# Patient Record
Sex: FEMALE | Race: WHITE | NOT HISPANIC OR LATINO | URBAN - METROPOLITAN AREA
[De-identification: names, ages, dates, MRNs, and addresses within clinical notes are randomized per-mention and may not be internally consistent; named-entity substitution may affect disease eponyms.]

---

## 2017-01-27 ENCOUNTER — OUTPATIENT (OUTPATIENT)
Dept: OUTPATIENT SERVICES | Facility: HOSPITAL | Age: 62
LOS: 1 days | End: 2017-01-27
Payer: COMMERCIAL

## 2017-01-27 ENCOUNTER — APPOINTMENT (OUTPATIENT)
Dept: CT IMAGING | Facility: IMAGING CENTER | Age: 62
End: 2017-01-27

## 2017-01-27 ENCOUNTER — APPOINTMENT (OUTPATIENT)
Dept: MRI IMAGING | Facility: IMAGING CENTER | Age: 62
End: 2017-01-27

## 2017-01-27 DIAGNOSIS — Z00.8 ENCOUNTER FOR OTHER GENERAL EXAMINATION: ICD-10-CM

## 2017-01-27 PROCEDURE — A9585: CPT

## 2017-01-27 PROCEDURE — 71260 CT THORAX DX C+: CPT

## 2017-01-27 PROCEDURE — 82565 ASSAY OF CREATININE: CPT

## 2017-01-27 PROCEDURE — 72197 MRI PELVIS W/O & W/DYE: CPT

## 2017-01-27 PROCEDURE — 74177 CT ABD & PELVIS W/CONTRAST: CPT

## 2017-01-31 DIAGNOSIS — I31.3 PERICARDIAL EFFUSION (NONINFLAMMATORY): ICD-10-CM

## 2017-01-31 DIAGNOSIS — C54.1 MALIGNANT NEOPLASM OF ENDOMETRIUM: ICD-10-CM

## 2017-01-31 DIAGNOSIS — C55 MALIGNANT NEOPLASM OF UTERUS, PART UNSPECIFIED: ICD-10-CM

## 2017-03-04 ENCOUNTER — APPOINTMENT (OUTPATIENT)
Dept: ULTRASOUND IMAGING | Facility: IMAGING CENTER | Age: 62
End: 2017-03-04

## 2017-03-04 ENCOUNTER — OUTPATIENT (OUTPATIENT)
Dept: OUTPATIENT SERVICES | Facility: HOSPITAL | Age: 62
LOS: 1 days | End: 2017-03-04
Payer: COMMERCIAL

## 2017-03-04 DIAGNOSIS — Z00.8 ENCOUNTER FOR OTHER GENERAL EXAMINATION: ICD-10-CM

## 2017-03-04 PROCEDURE — 93971 EXTREMITY STUDY: CPT

## 2017-08-18 ENCOUNTER — APPOINTMENT (OUTPATIENT)
Dept: CT IMAGING | Facility: IMAGING CENTER | Age: 62
End: 2017-08-18
Payer: COMMERCIAL

## 2017-08-18 ENCOUNTER — APPOINTMENT (OUTPATIENT)
Dept: MRI IMAGING | Facility: IMAGING CENTER | Age: 62
End: 2017-08-18
Payer: COMMERCIAL

## 2017-08-18 ENCOUNTER — OUTPATIENT (OUTPATIENT)
Dept: OUTPATIENT SERVICES | Facility: HOSPITAL | Age: 62
LOS: 1 days | End: 2017-08-18
Payer: COMMERCIAL

## 2017-08-18 DIAGNOSIS — Z00.8 ENCOUNTER FOR OTHER GENERAL EXAMINATION: ICD-10-CM

## 2017-08-18 PROCEDURE — 74177 CT ABD & PELVIS W/CONTRAST: CPT | Mod: 26

## 2017-08-18 PROCEDURE — 74177 CT ABD & PELVIS W/CONTRAST: CPT

## 2017-08-18 PROCEDURE — 71260 CT THORAX DX C+: CPT

## 2017-08-18 PROCEDURE — 71260 CT THORAX DX C+: CPT | Mod: 26

## 2017-08-18 PROCEDURE — 82565 ASSAY OF CREATININE: CPT

## 2017-08-22 ENCOUNTER — RESULT REVIEW (OUTPATIENT)
Age: 62
End: 2017-08-22

## 2017-10-06 ENCOUNTER — APPOINTMENT (OUTPATIENT)
Dept: MAMMOGRAPHY | Facility: IMAGING CENTER | Age: 62
End: 2017-10-06
Payer: COMMERCIAL

## 2017-10-06 ENCOUNTER — APPOINTMENT (OUTPATIENT)
Dept: ULTRASOUND IMAGING | Facility: IMAGING CENTER | Age: 62
End: 2017-10-06
Payer: COMMERCIAL

## 2017-10-06 ENCOUNTER — OUTPATIENT (OUTPATIENT)
Dept: OUTPATIENT SERVICES | Facility: HOSPITAL | Age: 62
LOS: 1 days | End: 2017-10-06
Payer: COMMERCIAL

## 2017-10-06 DIAGNOSIS — Z00.8 ENCOUNTER FOR OTHER GENERAL EXAMINATION: ICD-10-CM

## 2017-10-06 PROCEDURE — 77063 BREAST TOMOSYNTHESIS BI: CPT

## 2017-10-06 PROCEDURE — G0202: CPT | Mod: 26

## 2017-10-06 PROCEDURE — 76641 ULTRASOUND BREAST COMPLETE: CPT

## 2017-10-06 PROCEDURE — 77067 SCR MAMMO BI INCL CAD: CPT

## 2017-10-06 PROCEDURE — 76641 ULTRASOUND BREAST COMPLETE: CPT | Mod: 26,50

## 2017-10-06 PROCEDURE — 77063 BREAST TOMOSYNTHESIS BI: CPT | Mod: 26

## 2017-10-27 ENCOUNTER — APPOINTMENT (OUTPATIENT)
Dept: GYNECOLOGIC ONCOLOGY | Facility: CLINIC | Age: 62
End: 2017-10-27
Payer: COMMERCIAL

## 2017-10-27 VITALS
DIASTOLIC BLOOD PRESSURE: 64 MMHG | BODY MASS INDEX: 27.33 KG/M2 | HEART RATE: 69 BPM | SYSTOLIC BLOOD PRESSURE: 106 MMHG | HEIGHT: 65.5 IN | WEIGHT: 166 LBS

## 2017-10-27 DIAGNOSIS — Z78.9 OTHER SPECIFIED HEALTH STATUS: ICD-10-CM

## 2017-10-27 DIAGNOSIS — Z80.3 FAMILY HISTORY OF MALIGNANT NEOPLASM OF BREAST: ICD-10-CM

## 2017-10-27 DIAGNOSIS — K40.90 UNILATERAL INGUINAL HERNIA, W/OUT OBSTRUCTION OR GANGRENE, NOT SPECIFIED AS RECURRENT: ICD-10-CM

## 2017-10-27 DIAGNOSIS — Z80.0 FAMILY HISTORY OF MALIGNANT NEOPLASM OF DIGESTIVE ORGANS: ICD-10-CM

## 2017-10-27 DIAGNOSIS — N93.9 ABNORMAL UTERINE AND VAGINAL BLEEDING, UNSPECIFIED: ICD-10-CM

## 2017-10-27 DIAGNOSIS — Z80.1 FAMILY HISTORY OF MALIGNANT NEOPLASM OF TRACHEA, BRONCHUS AND LUNG: ICD-10-CM

## 2017-10-27 PROCEDURE — 99204 OFFICE O/P NEW MOD 45 MIN: CPT

## 2017-10-27 RX ORDER — DENOSUMAB 60 MG/ML
60 INJECTION SUBCUTANEOUS
Refills: 0 | Status: ACTIVE | COMMUNITY

## 2018-03-23 ENCOUNTER — OUTPATIENT (OUTPATIENT)
Dept: OUTPATIENT SERVICES | Facility: HOSPITAL | Age: 63
LOS: 1 days | End: 2018-03-23
Payer: COMMERCIAL

## 2018-03-23 ENCOUNTER — APPOINTMENT (OUTPATIENT)
Dept: MRI IMAGING | Facility: IMAGING CENTER | Age: 63
End: 2018-03-23
Payer: COMMERCIAL

## 2018-03-23 ENCOUNTER — APPOINTMENT (OUTPATIENT)
Dept: CT IMAGING | Facility: IMAGING CENTER | Age: 63
End: 2018-03-23
Payer: COMMERCIAL

## 2018-03-23 DIAGNOSIS — C55 MALIGNANT NEOPLASM OF UTERUS, PART UNSPECIFIED: ICD-10-CM

## 2018-03-23 PROCEDURE — 74183 MRI ABD W/O CNTR FLWD CNTR: CPT | Mod: 26

## 2018-03-23 PROCEDURE — 71250 CT THORAX DX C-: CPT

## 2018-03-23 PROCEDURE — 74183 MRI ABD W/O CNTR FLWD CNTR: CPT

## 2018-03-23 PROCEDURE — 72197 MRI PELVIS W/O & W/DYE: CPT

## 2018-03-23 PROCEDURE — 71250 CT THORAX DX C-: CPT | Mod: 26

## 2018-03-23 PROCEDURE — 72197 MRI PELVIS W/O & W/DYE: CPT | Mod: 26

## 2018-03-23 PROCEDURE — 82565 ASSAY OF CREATININE: CPT

## 2018-03-23 PROCEDURE — A9585: CPT

## 2018-04-06 ENCOUNTER — OUTPATIENT (OUTPATIENT)
Dept: OUTPATIENT SERVICES | Facility: HOSPITAL | Age: 63
LOS: 1 days | End: 2018-04-06
Payer: COMMERCIAL

## 2018-04-06 ENCOUNTER — APPOINTMENT (OUTPATIENT)
Dept: MAMMOGRAPHY | Facility: IMAGING CENTER | Age: 63
End: 2018-04-06
Payer: COMMERCIAL

## 2018-04-06 DIAGNOSIS — Z00.8 ENCOUNTER FOR OTHER GENERAL EXAMINATION: ICD-10-CM

## 2018-04-06 PROCEDURE — G0279: CPT

## 2018-04-06 PROCEDURE — 77065 DX MAMMO INCL CAD UNI: CPT

## 2018-04-06 PROCEDURE — 77065 DX MAMMO INCL CAD UNI: CPT | Mod: 26,RT

## 2018-04-06 PROCEDURE — G0279: CPT | Mod: 26

## 2018-07-05 ENCOUNTER — RX RENEWAL (OUTPATIENT)
Age: 63
End: 2018-07-05

## 2018-07-20 ENCOUNTER — RECORD ABSTRACTING (OUTPATIENT)
Age: 63
End: 2018-07-20

## 2018-07-26 ENCOUNTER — FORM ENCOUNTER (OUTPATIENT)
Age: 63
End: 2018-07-26

## 2018-07-27 ENCOUNTER — OUTPATIENT (OUTPATIENT)
Dept: OUTPATIENT SERVICES | Facility: HOSPITAL | Age: 63
LOS: 1 days | End: 2018-07-27
Payer: COMMERCIAL

## 2018-07-27 ENCOUNTER — APPOINTMENT (OUTPATIENT)
Dept: MRI IMAGING | Facility: IMAGING CENTER | Age: 63
End: 2018-07-27
Payer: COMMERCIAL

## 2018-07-27 DIAGNOSIS — C55 MALIGNANT NEOPLASM OF UTERUS, PART UNSPECIFIED: ICD-10-CM

## 2018-07-27 PROCEDURE — 73723 MRI JOINT LWR EXTR W/O&W/DYE: CPT

## 2018-07-27 PROCEDURE — 73723 MRI JOINT LWR EXTR W/O&W/DYE: CPT | Mod: 26,LT

## 2018-07-27 PROCEDURE — 82565 ASSAY OF CREATININE: CPT

## 2018-07-27 PROCEDURE — A9585: CPT

## 2018-08-10 ENCOUNTER — APPOINTMENT (OUTPATIENT)
Dept: PULMONOLOGY | Facility: CLINIC | Age: 63
End: 2018-08-10

## 2018-11-16 ENCOUNTER — APPOINTMENT (OUTPATIENT)
Dept: ULTRASOUND IMAGING | Facility: IMAGING CENTER | Age: 63
End: 2018-11-16
Payer: COMMERCIAL

## 2018-11-16 ENCOUNTER — OUTPATIENT (OUTPATIENT)
Dept: OUTPATIENT SERVICES | Facility: HOSPITAL | Age: 63
LOS: 1 days | End: 2018-11-16
Payer: COMMERCIAL

## 2018-11-16 ENCOUNTER — APPOINTMENT (OUTPATIENT)
Dept: MAMMOGRAPHY | Facility: IMAGING CENTER | Age: 63
End: 2018-11-16
Payer: COMMERCIAL

## 2018-11-16 DIAGNOSIS — R92.8 OTHER ABNORMAL AND INCONCLUSIVE FINDINGS ON DIAGNOSTIC IMAGING OF BREAST: ICD-10-CM

## 2018-11-16 PROCEDURE — 77067 SCR MAMMO BI INCL CAD: CPT | Mod: 26

## 2018-11-16 PROCEDURE — 77063 BREAST TOMOSYNTHESIS BI: CPT | Mod: 26

## 2018-11-16 PROCEDURE — 76641 ULTRASOUND BREAST COMPLETE: CPT | Mod: 26,50

## 2018-11-16 PROCEDURE — 76641 ULTRASOUND BREAST COMPLETE: CPT

## 2018-11-16 PROCEDURE — 77063 BREAST TOMOSYNTHESIS BI: CPT

## 2018-11-16 PROCEDURE — 77067 SCR MAMMO BI INCL CAD: CPT

## 2018-12-07 ENCOUNTER — APPOINTMENT (OUTPATIENT)
Dept: PULMONOLOGY | Facility: CLINIC | Age: 63
End: 2018-12-07
Payer: COMMERCIAL

## 2018-12-07 VITALS
HEIGHT: 65 IN | HEART RATE: 69 BPM | WEIGHT: 160 LBS | BODY MASS INDEX: 26.66 KG/M2 | SYSTOLIC BLOOD PRESSURE: 103 MMHG | RESPIRATION RATE: 16 BRPM | TEMPERATURE: 98.7 F | OXYGEN SATURATION: 98 % | DIASTOLIC BLOOD PRESSURE: 67 MMHG

## 2018-12-07 PROCEDURE — 99213 OFFICE O/P EST LOW 20 MIN: CPT

## 2019-02-08 ENCOUNTER — APPOINTMENT (OUTPATIENT)
Dept: GYNECOLOGIC ONCOLOGY | Facility: CLINIC | Age: 64
End: 2019-02-08
Payer: MEDICARE

## 2019-02-08 VITALS
HEIGHT: 65 IN | SYSTOLIC BLOOD PRESSURE: 133 MMHG | HEART RATE: 72 BPM | BODY MASS INDEX: 28.57 KG/M2 | DIASTOLIC BLOOD PRESSURE: 79 MMHG | WEIGHT: 171.5 LBS

## 2019-02-08 PROCEDURE — 99213 OFFICE O/P EST LOW 20 MIN: CPT

## 2019-03-29 ENCOUNTER — RX RENEWAL (OUTPATIENT)
Age: 64
End: 2019-03-29

## 2019-04-26 ENCOUNTER — LABORATORY RESULT (OUTPATIENT)
Age: 64
End: 2019-04-26

## 2019-04-26 ENCOUNTER — APPOINTMENT (OUTPATIENT)
Dept: PULMONOLOGY | Facility: CLINIC | Age: 64
End: 2019-04-26
Payer: MEDICARE

## 2019-04-26 VITALS
SYSTOLIC BLOOD PRESSURE: 107 MMHG | RESPIRATION RATE: 14 BRPM | HEART RATE: 77 BPM | OXYGEN SATURATION: 97 % | TEMPERATURE: 99.2 F | DIASTOLIC BLOOD PRESSURE: 69 MMHG

## 2019-04-26 PROCEDURE — 99214 OFFICE O/P EST MOD 30 MIN: CPT | Mod: 25

## 2019-04-26 PROCEDURE — 77080 DXA BONE DENSITY AXIAL: CPT

## 2019-04-26 PROCEDURE — 36415 COLL VENOUS BLD VENIPUNCTURE: CPT

## 2019-05-03 LAB
25(OH)D3 SERPL-MCNC: 49.9 NG/ML
ALBUMIN SERPL ELPH-MCNC: 4.6 G/DL
ALP BLD-CCNC: 44 U/L
ALT SERPL-CCNC: 21 U/L
ANION GAP SERPL CALC-SCNC: 15 MMOL/L
AST SERPL-CCNC: 28 U/L
BASOPHILS # BLD AUTO: 0.06 K/UL
BASOPHILS NFR BLD AUTO: 1 %
BILIRUB SERPL-MCNC: 0.3 MG/DL
BUN SERPL-MCNC: 23 MG/DL
CALCIUM SERPL-MCNC: 11 MG/DL
CHLORIDE SERPL-SCNC: 101 MMOL/L
CHOLEST SERPL-MCNC: 191 MG/DL
CHOLEST/HDLC SERPL: 3 RATIO
CO2 SERPL-SCNC: 30 MMOL/L
CREAT SERPL-MCNC: 0.9 MG/DL
EOSINOPHIL # BLD AUTO: 0.21 K/UL
EOSINOPHIL NFR BLD AUTO: 3.5 %
ESTIMATED AVERAGE GLUCOSE: 111 MG/DL
GLUCOSE SERPL-MCNC: 89 MG/DL
HBA1C MFR BLD HPLC: 5.5 %
HCT VFR BLD CALC: 40.6 %
HDLC SERPL-MCNC: 64 MG/DL
HGB BLD-MCNC: 13 G/DL
IMM GRANULOCYTES NFR BLD AUTO: 0.2 %
LDLC SERPL CALC-MCNC: 104 MG/DL
LYMPHOCYTES # BLD AUTO: 1.75 K/UL
LYMPHOCYTES NFR BLD AUTO: 29.5 %
MAGNESIUM SERPL-MCNC: 2.2 MG/DL
MAN DIFF?: NORMAL
MCHC RBC-ENTMCNC: 29.8 PG
MCHC RBC-ENTMCNC: 32 GM/DL
MCV RBC AUTO: 93.1 FL
MONOCYTES # BLD AUTO: 0.5 K/UL
MONOCYTES NFR BLD AUTO: 8.4 %
NEUTROPHILS # BLD AUTO: 3.4 K/UL
NEUTROPHILS NFR BLD AUTO: 57.4 %
PHOSPHATE SERPL-MCNC: 4 MG/DL
PLATELET # BLD AUTO: 205 K/UL
POTASSIUM SERPL-SCNC: 4.8 MMOL/L
PROT SERPL-MCNC: 7.3 G/DL
RBC # BLD: 4.36 M/UL
RBC # FLD: 13 %
SODIUM SERPL-SCNC: 145 MMOL/L
T3 SERPL-MCNC: 103 NG/DL
T3RU NFR SERPL: 1.1 TBI
T4 FREE SERPL-MCNC: 1.2 NG/DL
T4 SERPL-MCNC: 8.1 UG/DL
TRIGL SERPL-MCNC: 117 MG/DL
TSH SERPL-ACNC: 2.87 UIU/ML
WBC # FLD AUTO: 5.93 K/UL

## 2019-06-28 ENCOUNTER — RX RENEWAL (OUTPATIENT)
Age: 64
End: 2019-06-28

## 2019-10-04 ENCOUNTER — APPOINTMENT (OUTPATIENT)
Dept: UROLOGY | Facility: CLINIC | Age: 64
End: 2019-10-04
Payer: MEDICARE

## 2019-10-04 PROCEDURE — 99203 OFFICE O/P NEW LOW 30 MIN: CPT

## 2019-10-04 RX ORDER — SILVER SULFADIAZINE 10 MG/G
1 CREAM TOPICAL TWICE DAILY
Qty: 1 | Refills: 1 | Status: COMPLETED | COMMUNITY
Start: 2019-01-25 | End: 2019-10-04

## 2019-10-04 RX ORDER — METOPROLOL TARTRATE 25 MG/1
25 TABLET, FILM COATED ORAL
Qty: 90 | Refills: 3 | Status: COMPLETED | COMMUNITY
Start: 2017-09-19 | End: 2019-10-04

## 2019-10-04 RX ORDER — SULFAMETHOXAZOLE AND TRIMETHOPRIM 800; 160 MG/1; MG/1
800-160 TABLET ORAL TWICE DAILY
Qty: 14 | Refills: 0 | Status: COMPLETED | COMMUNITY
Start: 2019-08-16 | End: 2019-10-04

## 2019-10-04 RX ORDER — NITROFURANTOIN (MONOHYDRATE/MACROCRYSTALS) 25; 75 MG/1; MG/1
100 CAPSULE ORAL
Qty: 10 | Refills: 0 | Status: COMPLETED | COMMUNITY
Start: 2018-11-16 | End: 2019-10-04

## 2019-10-04 RX ORDER — METOPROLOL SUCCINATE 25 MG/1
25 TABLET, EXTENDED RELEASE ORAL DAILY
Qty: 90 | Refills: 3 | Status: COMPLETED | COMMUNITY
Start: 2019-06-28 | End: 2019-10-04

## 2019-10-10 ENCOUNTER — RESULT REVIEW (OUTPATIENT)
Age: 64
End: 2019-10-10

## 2019-10-13 NOTE — ASSESSMENT
[FreeTextEntry1] : 65 yo F with recent UTI. Prior history of recurrent UTI\par \par - discussed possible etiologies for UTI. Spent extensive period of time discussed behavioral modification including adequate hydration, cutting back on caffeine intake, timed voiding during the day, importance of controlling any diabetes and chronic constipation and how all of these things could potentially increase risk for persistent or recurrent UTI.\par - UA, culture\par

## 2019-10-13 NOTE — PHYSICAL EXAM
[General Appearance - Well Developed] : well developed [General Appearance - Well Nourished] : well nourished [Normal Appearance] : normal appearance [Well Groomed] : well groomed [General Appearance - In No Acute Distress] : no acute distress [Edema] : no peripheral edema [Respiration, Rhythm And Depth] : normal respiratory rhythm and effort [Exaggerated Use Of Accessory Muscles For Inspiration] : no accessory muscle use [Abdomen Soft] : soft [Abdomen Tenderness] : non-tender [Costovertebral Angle Tenderness] : no ~M costovertebral angle tenderness [Urethral Meatus] : normal urethra [Urinary Bladder Findings] : the bladder was normal on palpation [External Female Genitalia] : normal external genitalia [Normal Station and Gait] : the gait and station were normal for the patient's age [] : no rash [No Focal Deficits] : no focal deficits [Oriented To Time, Place, And Person] : oriented to person, place, and time [Affect] : the affect was normal [Mood] : the mood was normal [Not Anxious] : not anxious [No Palpable Adenopathy] : no palpable adenopathy

## 2019-11-05 ENCOUNTER — APPOINTMENT (OUTPATIENT)
Dept: PULMONOLOGY | Facility: CLINIC | Age: 64
End: 2019-11-05
Payer: MEDICARE

## 2019-11-05 VITALS
DIASTOLIC BLOOD PRESSURE: 69 MMHG | SYSTOLIC BLOOD PRESSURE: 110 MMHG | HEART RATE: 68 BPM | TEMPERATURE: 97.9 F | OXYGEN SATURATION: 94 %

## 2019-11-05 LAB
APPEARANCE: CLEAR
BACTERIA UR CULT: NORMAL
BACTERIA: NEGATIVE
BILIRUBIN URINE: NEGATIVE
BLOOD URINE: NEGATIVE
COLOR: NORMAL
GLUCOSE QUALITATIVE U: NEGATIVE
HYALINE CASTS: 0 /LPF
KETONES URINE: NEGATIVE
LEUKOCYTE ESTERASE URINE: NEGATIVE
MICROSCOPIC-UA: NORMAL
NITRITE URINE: NEGATIVE
PH URINE: 6
PROTEIN URINE: NEGATIVE
RED BLOOD CELLS URINE: 1 /HPF
SPECIFIC GRAVITY URINE: 1.02
SQUAMOUS EPITHELIAL CELLS: 1 /HPF
UROBILINOGEN URINE: NORMAL
WHITE BLOOD CELLS URINE: 1 /HPF

## 2019-11-05 PROCEDURE — 99214 OFFICE O/P EST MOD 30 MIN: CPT | Mod: 25

## 2019-11-05 PROCEDURE — G0009: CPT

## 2019-11-05 PROCEDURE — 90670 PCV13 VACCINE IM: CPT

## 2019-11-06 NOTE — ASSESSMENT
[FreeTextEntry1] : Prevnar vaccine administered today\par Medications reviewed. Continue present medications.\par

## 2019-12-05 ENCOUNTER — FORM ENCOUNTER (OUTPATIENT)
Age: 64
End: 2019-12-05

## 2019-12-06 ENCOUNTER — OUTPATIENT (OUTPATIENT)
Dept: OUTPATIENT SERVICES | Facility: HOSPITAL | Age: 64
LOS: 1 days | End: 2019-12-06
Payer: MEDICARE

## 2019-12-06 ENCOUNTER — APPOINTMENT (OUTPATIENT)
Dept: MAMMOGRAPHY | Facility: IMAGING CENTER | Age: 64
End: 2019-12-06
Payer: MEDICARE

## 2019-12-06 ENCOUNTER — APPOINTMENT (OUTPATIENT)
Dept: ULTRASOUND IMAGING | Facility: IMAGING CENTER | Age: 64
End: 2019-12-06
Payer: MEDICARE

## 2019-12-06 DIAGNOSIS — Z00.00 ENCOUNTER FOR GENERAL ADULT MEDICAL EXAMINATION WITHOUT ABNORMAL FINDINGS: ICD-10-CM

## 2019-12-06 PROCEDURE — 76641 ULTRASOUND BREAST COMPLETE: CPT

## 2019-12-06 PROCEDURE — 76641 ULTRASOUND BREAST COMPLETE: CPT | Mod: 26,50

## 2019-12-06 PROCEDURE — 77063 BREAST TOMOSYNTHESIS BI: CPT | Mod: 26

## 2019-12-06 PROCEDURE — 77063 BREAST TOMOSYNTHESIS BI: CPT

## 2019-12-06 PROCEDURE — 77067 SCR MAMMO BI INCL CAD: CPT | Mod: 26

## 2019-12-06 PROCEDURE — 77067 SCR MAMMO BI INCL CAD: CPT

## 2019-12-16 ENCOUNTER — APPOINTMENT (OUTPATIENT)
Dept: PULMONOLOGY | Facility: CLINIC | Age: 64
End: 2019-12-16
Payer: MEDICARE

## 2019-12-16 VITALS
BODY MASS INDEX: 28.49 KG/M2 | SYSTOLIC BLOOD PRESSURE: 116 MMHG | HEIGHT: 65 IN | OXYGEN SATURATION: 95 % | RESPIRATION RATE: 16 BRPM | HEART RATE: 78 BPM | WEIGHT: 171 LBS | DIASTOLIC BLOOD PRESSURE: 72 MMHG

## 2019-12-16 PROCEDURE — 99214 OFFICE O/P EST MOD 30 MIN: CPT

## 2019-12-17 NOTE — PHYSICAL EXAM
[No Acute Distress] : no acute distress [Well Nourished] : well nourished [Well-Appearing] : well-appearing [PERRL] : pupils equal round and reactive to light [Normal Sclera/Conjunctiva] : normal sclera/conjunctiva [Well Developed] : well developed [EOMI] : extraocular movements intact [Normal Outer Ear/Nose] : the outer ears and nose were normal in appearance [No JVD] : no jugular venous distention [No Lymphadenopathy] : no lymphadenopathy [Normal Oropharynx] : the oropharynx was normal [No Respiratory Distress] : no respiratory distress  [Supple] : supple [Thyroid Normal, No Nodules] : the thyroid was normal and there were no nodules present [Clear to Auscultation] : lungs were clear to auscultation bilaterally [Normal Rate] : normal rate  [Regular Rhythm] : with a regular rhythm [No Accessory Muscle Use] : no accessory muscle use [Normal S1, S2] : normal S1 and S2 [No Carotid Bruits] : no carotid bruits [No Murmur] : no murmur heard [No Varicosities] : no varicosities [No Edema] : there was no peripheral edema [No Abdominal Bruit] : a ~M bruit was not heard ~T in the abdomen [Pedal Pulses Present] : the pedal pulses are present [No Palpable Aorta] : no palpable aorta [No Extremity Clubbing/Cyanosis] : no extremity clubbing/cyanosis [Soft] : abdomen soft [Non-distended] : non-distended [Non Tender] : non-tender [No Masses] : no abdominal mass palpated [No HSM] : no HSM [Normal Bowel Sounds] : normal bowel sounds [Normal Posterior Cervical Nodes] : no posterior cervical lymphadenopathy [Normal Anterior Cervical Nodes] : no anterior cervical lymphadenopathy [No CVA Tenderness] : no CVA  tenderness [No Joint Swelling] : no joint swelling [No Spinal Tenderness] : no spinal tenderness [Grossly Normal Strength/Tone] : grossly normal strength/tone [Coordination Grossly Intact] : coordination grossly intact [No Rash] : no rash [Normal Gait] : normal gait [Deep Tendon Reflexes (DTR)] : deep tendon reflexes were 2+ and symmetric [No Focal Deficits] : no focal deficits [Normal Affect] : the affect was normal [Normal Insight/Judgement] : insight and judgment were intact

## 2019-12-17 NOTE — PLAN
[FreeTextEntry1] : 5 more days of Bactrim prescribe\par A dose of Diflucan prescribed\par Medications reviewed. Continue present medications.\par

## 2020-02-28 ENCOUNTER — APPOINTMENT (OUTPATIENT)
Dept: PULMONOLOGY | Facility: CLINIC | Age: 65
End: 2020-02-28
Payer: MEDICARE

## 2020-02-28 ENCOUNTER — LABORATORY RESULT (OUTPATIENT)
Age: 65
End: 2020-02-28

## 2020-02-28 VITALS
SYSTOLIC BLOOD PRESSURE: 144 MMHG | DIASTOLIC BLOOD PRESSURE: 93 MMHG | RESPIRATION RATE: 16 BRPM | OXYGEN SATURATION: 97 % | TEMPERATURE: 98.8 F | HEART RATE: 66 BPM

## 2020-02-28 PROCEDURE — 36415 COLL VENOUS BLD VENIPUNCTURE: CPT

## 2020-02-28 PROCEDURE — 99214 OFFICE O/P EST MOD 30 MIN: CPT | Mod: 25

## 2020-03-02 NOTE — PHYSICAL EXAM
[No Acute Distress] : no acute distress [Well Nourished] : well nourished [Well Developed] : well developed [Well-Appearing] : well-appearing [Normal Sclera/Conjunctiva] : normal sclera/conjunctiva [PERRL] : pupils equal round and reactive to light [EOMI] : extraocular movements intact [Normal Outer Ear/Nose] : the outer ears and nose were normal in appearance [Normal Oropharynx] : the oropharynx was normal [No JVD] : no jugular venous distention [No Lymphadenopathy] : no lymphadenopathy [No Respiratory Distress] : no respiratory distress  [Supple] : supple [Thyroid Normal, No Nodules] : the thyroid was normal and there were no nodules present [Clear to Auscultation] : lungs were clear to auscultation bilaterally [No Accessory Muscle Use] : no accessory muscle use [Normal S1, S2] : normal S1 and S2 [Normal Rate] : normal rate  [Regular Rhythm] : with a regular rhythm [No Carotid Bruits] : no carotid bruits [No Murmur] : no murmur heard [No Abdominal Bruit] : a ~M bruit was not heard ~T in the abdomen [No Varicosities] : no varicosities [Pedal Pulses Present] : the pedal pulses are present [No Edema] : there was no peripheral edema [No Extremity Clubbing/Cyanosis] : no extremity clubbing/cyanosis [No Palpable Aorta] : no palpable aorta [Non Tender] : non-tender [Soft] : abdomen soft [Non-distended] : non-distended [No HSM] : no HSM [No Masses] : no abdominal mass palpated [Normal Bowel Sounds] : normal bowel sounds [Normal Posterior Cervical Nodes] : no posterior cervical lymphadenopathy [Normal Anterior Cervical Nodes] : no anterior cervical lymphadenopathy [No CVA Tenderness] : no CVA  tenderness [No Spinal Tenderness] : no spinal tenderness [No Joint Swelling] : no joint swelling [Grossly Normal Strength/Tone] : grossly normal strength/tone [No Rash] : no rash [Coordination Grossly Intact] : coordination grossly intact [No Focal Deficits] : no focal deficits [Normal Gait] : normal gait [Normal Insight/Judgement] : insight and judgment were intact [Normal Affect] : the affect was normal [Deep Tendon Reflexes (DTR)] : deep tendon reflexes were 2+ and symmetric

## 2020-03-13 LAB
25(OH)D3 SERPL-MCNC: 57.2 NG/ML
ALBUMIN SERPL ELPH-MCNC: 4.4 G/DL
ALP BLD-CCNC: 44 U/L
ALT SERPL-CCNC: 15 U/L
ANION GAP SERPL CALC-SCNC: 12 MMOL/L
AST SERPL-CCNC: 21 U/L
BASOPHILS # BLD AUTO: 0.05 K/UL
BASOPHILS NFR BLD AUTO: 0.8 %
BILIRUB SERPL-MCNC: 0.3 MG/DL
BUN SERPL-MCNC: 19 MG/DL
CALCIUM SERPL-MCNC: 9.9 MG/DL
CHLORIDE SERPL-SCNC: 105 MMOL/L
CHOLEST SERPL-MCNC: 198 MG/DL
CHOLEST/HDLC SERPL: 3.1 RATIO
CO2 SERPL-SCNC: 26 MMOL/L
CREAT SERPL-MCNC: 0.81 MG/DL
EOSINOPHIL # BLD AUTO: 0.19 K/UL
EOSINOPHIL NFR BLD AUTO: 2.9 %
ESTIMATED AVERAGE GLUCOSE: 111 MG/DL
GLUCOSE SERPL-MCNC: 87 MG/DL
HBA1C MFR BLD HPLC: 5.5 %
HCT VFR BLD CALC: 38.2 %
HCV AB SER QL: NONREACTIVE
HCV S/CO RATIO: 0.12 S/CO
HDLC SERPL-MCNC: 65 MG/DL
HGB BLD-MCNC: 12.3 G/DL
IMM GRANULOCYTES NFR BLD AUTO: 0.2 %
LDLC SERPL CALC-MCNC: 110 MG/DL
LYMPHOCYTES # BLD AUTO: 1.61 K/UL
LYMPHOCYTES NFR BLD AUTO: 24.9 %
MAGNESIUM SERPL-MCNC: 2.2 MG/DL
MAN DIFF?: NORMAL
MCHC RBC-ENTMCNC: 29.6 PG
MCHC RBC-ENTMCNC: 32.2 GM/DL
MCV RBC AUTO: 92 FL
MONOCYTES # BLD AUTO: 0.41 K/UL
MONOCYTES NFR BLD AUTO: 6.3 %
NEUTROPHILS # BLD AUTO: 4.19 K/UL
NEUTROPHILS NFR BLD AUTO: 64.9 %
PHOSPHATE SERPL-MCNC: 3.6 MG/DL
PLATELET # BLD AUTO: 233 K/UL
POTASSIUM SERPL-SCNC: 4.4 MMOL/L
PROT SERPL-MCNC: 6.8 G/DL
RBC # BLD: 4.15 M/UL
RBC # FLD: 13.3 %
SODIUM SERPL-SCNC: 143 MMOL/L
T3 SERPL-MCNC: 104 NG/DL
T3RU NFR SERPL: 1.1 TBI
T4 FREE SERPL-MCNC: 1.1 NG/DL
T4 SERPL-MCNC: 7.3 UG/DL
TRIGL SERPL-MCNC: 118 MG/DL
TSH SERPL-ACNC: 2.04 UIU/ML
WBC # FLD AUTO: 6.46 K/UL

## 2020-09-22 ENCOUNTER — APPOINTMENT (OUTPATIENT)
Dept: PULMONOLOGY | Facility: CLINIC | Age: 65
End: 2020-09-22
Payer: MEDICARE

## 2020-09-22 DIAGNOSIS — Z87.440 PERSONAL HISTORY OF URINARY (TRACT) INFECTIONS: ICD-10-CM

## 2020-09-22 PROCEDURE — 99214 OFFICE O/P EST MOD 30 MIN: CPT | Mod: 95

## 2020-09-22 NOTE — PLAN
[FreeTextEntry1] : Prescribed a course of prednisone taper for left sciatica.\par Extending Bactrim DS to total of 10 days for UTI.\par \par Time spent in telehealth consultation and charting is 25 minutes

## 2020-11-06 ENCOUNTER — APPOINTMENT (OUTPATIENT)
Dept: PULMONOLOGY | Facility: CLINIC | Age: 65
End: 2020-11-06

## 2020-11-06 ENCOUNTER — APPOINTMENT (OUTPATIENT)
Dept: PULMONOLOGY | Facility: CLINIC | Age: 65
End: 2020-11-06
Payer: MEDICARE

## 2020-11-06 VITALS
OXYGEN SATURATION: 77 % | HEART RATE: 87 BPM | DIASTOLIC BLOOD PRESSURE: 77 MMHG | SYSTOLIC BLOOD PRESSURE: 131 MMHG | RESPIRATION RATE: 16 BRPM | TEMPERATURE: 97.6 F

## 2020-11-06 PROCEDURE — 99212 OFFICE O/P EST SF 10 MIN: CPT

## 2020-11-06 NOTE — PHYSICAL EXAM
[No Acute Distress] : no acute distress [Normal Oropharynx] : normal oropharynx [Normal Appearance] : normal appearance [Normal Rate/Rhythm] : normal rate/rhythm [Normal S1, S2] : normal s1, s2 [No Murmurs] : no murmurs [No Resp Distress] : no resp distress [Clear to Auscultation Bilaterally] : clear to auscultation bilaterally [Not Tender] : not tender [Soft] : soft [No HSM] : no hsm [Normal Bowel Sounds] : normal bowel sounds [Oriented x3] : oriented x3 [Normal Affect] : normal affect

## 2020-11-06 NOTE — DISCUSSION/SUMMARY
[FreeTextEntry1] : Pina osteoporosis\par Patient is managed by endocrinology on Prolia\par We asked if she would check first before repeating the bone density regarding insurance coverage and if endocrinology feels it is indicated she will return to undergo the bone density\par

## 2020-12-07 ENCOUNTER — APPOINTMENT (OUTPATIENT)
Dept: ULTRASOUND IMAGING | Facility: IMAGING CENTER | Age: 65
End: 2020-12-07
Payer: MEDICARE

## 2020-12-07 ENCOUNTER — APPOINTMENT (OUTPATIENT)
Dept: MAMMOGRAPHY | Facility: IMAGING CENTER | Age: 65
End: 2020-12-07
Payer: MEDICARE

## 2020-12-07 ENCOUNTER — RESULT REVIEW (OUTPATIENT)
Age: 65
End: 2020-12-07

## 2020-12-07 ENCOUNTER — OUTPATIENT (OUTPATIENT)
Dept: OUTPATIENT SERVICES | Facility: HOSPITAL | Age: 65
LOS: 1 days | End: 2020-12-07
Payer: MEDICARE

## 2020-12-07 DIAGNOSIS — Z00.00 ENCOUNTER FOR GENERAL ADULT MEDICAL EXAMINATION WITHOUT ABNORMAL FINDINGS: ICD-10-CM

## 2020-12-07 PROCEDURE — 77067 SCR MAMMO BI INCL CAD: CPT

## 2020-12-07 PROCEDURE — 77063 BREAST TOMOSYNTHESIS BI: CPT | Mod: 26

## 2020-12-07 PROCEDURE — 77067 SCR MAMMO BI INCL CAD: CPT | Mod: 26

## 2020-12-07 PROCEDURE — 76641 ULTRASOUND BREAST COMPLETE: CPT | Mod: 26,50

## 2020-12-07 PROCEDURE — 77063 BREAST TOMOSYNTHESIS BI: CPT

## 2020-12-07 PROCEDURE — 76641 ULTRASOUND BREAST COMPLETE: CPT

## 2020-12-23 PROBLEM — Z87.440 HISTORY OF URINARY TRACT INFECTION: Status: RESOLVED | Noted: 2019-10-04 | Resolved: 2020-12-23

## 2021-02-19 ENCOUNTER — APPOINTMENT (OUTPATIENT)
Dept: PULMONOLOGY | Facility: CLINIC | Age: 66
End: 2021-02-19
Payer: MEDICARE

## 2021-02-19 PROCEDURE — 99214 OFFICE O/P EST MOD 30 MIN: CPT | Mod: 95

## 2021-02-21 NOTE — HISTORY OF PRESENT ILLNESS
[FreeTextEntry1] : Complains of pain shooting down her left leg.  No chest pain or shortness of breath [de-identified] : This visit was provided via telehealth using real-time 2-way audio visual technology.  The patient, OMARI KHANNA, was located at home, [id number] [place] [address] at the time of the visit.  \par The provider, Emi Weber, was located at the office [address] [facility], [address], [place], [place] at the time of the visit. \par The patient, Ms. OMARI KHANNA  and physician Emi Weber DO, participated in the telehealth encounter.\par \par Verbal consent was obtained by the  from patient.\par

## 2021-02-21 NOTE — PLAN
[FreeTextEntry1] : Start prednisone taper\par Spine surgery evaluation with Dr. Vu Tracey\par Medications reviewed. Continue present medications.\par \par \par \par Time spent in telehealth consultation and charting is 40 minutes.

## 2021-02-21 NOTE — ASSESSMENT
[FreeTextEntry1] : Pain shooting down left leg, likely left sciatica, rule out cancer on lumbar spine (unlikely)

## 2021-02-21 NOTE — REVIEW OF SYSTEMS
[Dysuria] : dysuria [Joint Pain] : joint pain [Negative] : Heme/Lymph [de-identified] : Pain shooting down the left leg

## 2021-03-30 ENCOUNTER — APPOINTMENT (OUTPATIENT)
Dept: MRI IMAGING | Facility: IMAGING CENTER | Age: 66
End: 2021-03-30

## 2021-05-18 ENCOUNTER — APPOINTMENT (OUTPATIENT)
Dept: PULMONOLOGY | Facility: CLINIC | Age: 66
End: 2021-05-18
Payer: MEDICARE

## 2021-05-18 VITALS
BODY MASS INDEX: 27.49 KG/M2 | WEIGHT: 165 LBS | HEART RATE: 81 BPM | DIASTOLIC BLOOD PRESSURE: 61 MMHG | SYSTOLIC BLOOD PRESSURE: 130 MMHG | HEIGHT: 65 IN | TEMPERATURE: 97.7 F | OXYGEN SATURATION: 97 %

## 2021-05-18 PROCEDURE — 99214 OFFICE O/P EST MOD 30 MIN: CPT

## 2021-05-19 NOTE — HISTORY OF PRESENT ILLNESS
[FreeTextEntry1] : Complains of pain shooting down her left leg.  No chest pain or shortness of breath

## 2021-05-19 NOTE — PLAN
[FreeTextEntry1] : Spine surgery evaluation with Dr. Vu Tracey\par Medications reviewed. Continue present medications.\par Return in 1 month for annual physical examination.\par \par

## 2021-05-19 NOTE — REVIEW OF SYSTEMS
[Dysuria] : dysuria [Joint Pain] : joint pain [Negative] : Heme/Lymph [de-identified] : Pain shooting down the left leg

## 2021-06-14 ENCOUNTER — APPOINTMENT (OUTPATIENT)
Dept: PULMONOLOGY | Facility: CLINIC | Age: 66
End: 2021-06-14
Payer: MEDICARE

## 2021-06-14 ENCOUNTER — NON-APPOINTMENT (OUTPATIENT)
Age: 66
End: 2021-06-14

## 2021-06-14 ENCOUNTER — APPOINTMENT (OUTPATIENT)
Dept: PULMONOLOGY | Facility: CLINIC | Age: 66
End: 2021-06-14

## 2021-06-14 VITALS
SYSTOLIC BLOOD PRESSURE: 101 MMHG | TEMPERATURE: 97.9 F | DIASTOLIC BLOOD PRESSURE: 61 MMHG | HEIGHT: 65 IN | HEART RATE: 72 BPM | OXYGEN SATURATION: 96 %

## 2021-06-14 PROCEDURE — 99214 OFFICE O/P EST MOD 30 MIN: CPT | Mod: 25

## 2021-06-14 PROCEDURE — 77080 DXA BONE DENSITY AXIAL: CPT

## 2021-06-14 NOTE — PHYSICAL EXAM
[No Acute Distress] : no acute distress [Well Nourished] : well nourished [Well Developed] : well developed [Well-Appearing] : well-appearing [Normal Sclera/Conjunctiva] : normal sclera/conjunctiva [PERRL] : pupils equal round and reactive to light [EOMI] : extraocular movements intact [Normal Outer Ear/Nose] : the outer ears and nose were normal in appearance [Normal Oropharynx] : the oropharynx was normal [No JVD] : no jugular venous distention [No Lymphadenopathy] : no lymphadenopathy [Supple] : supple [Thyroid Normal, No Nodules] : the thyroid was normal and there were no nodules present [No Respiratory Distress] : no respiratory distress  [No Accessory Muscle Use] : no accessory muscle use [Clear to Auscultation] : lungs were clear to auscultation bilaterally [Normal Rate] : normal rate  [Regular Rhythm] : with a regular rhythm [Normal S1, S2] : normal S1 and S2 [No Murmur] : no murmur heard [No Carotid Bruits] : no carotid bruits [No Abdominal Bruit] : a ~M bruit was not heard ~T in the abdomen [No Varicosities] : no varicosities [Pedal Pulses Present] : the pedal pulses are present [No Edema] : there was no peripheral edema [No Palpable Aorta] : no palpable aorta [No Extremity Clubbing/Cyanosis] : no extremity clubbing/cyanosis [Soft] : abdomen soft [Non Tender] : non-tender [Non-distended] : non-distended [No Masses] : no abdominal mass palpated [No HSM] : no HSM [Normal Bowel Sounds] : normal bowel sounds [Normal Posterior Cervical Nodes] : no posterior cervical lymphadenopathy [Normal Anterior Cervical Nodes] : no anterior cervical lymphadenopathy [No CVA Tenderness] : no CVA  tenderness [No Spinal Tenderness] : no spinal tenderness [No Joint Swelling] : no joint swelling [Grossly Normal Strength/Tone] : grossly normal strength/tone [No Rash] : no rash [Coordination Grossly Intact] : coordination grossly intact [No Focal Deficits] : no focal deficits [Normal Gait] : normal gait [Deep Tendon Reflexes (DTR)] : deep tendon reflexes were 2+ and symmetric [Normal Affect] : the affect was normal [Normal Insight/Judgement] : insight and judgment were intact [de-identified] : Appears comfortable

## 2021-06-14 NOTE — PLAN
[FreeTextEntry1] : Medications reviewed. Continue present medications.\par Continue Prolia injection for history of osteoporosis.\par Bone density report faxed to Dr. Ovram Goldberg

## 2021-06-14 NOTE — REVIEW OF SYSTEMS
[Dysuria] : dysuria [Joint Pain] : joint pain [Negative] : Heme/Lymph [de-identified] : Pain shooting down the left leg

## 2021-07-13 ENCOUNTER — APPOINTMENT (OUTPATIENT)
Dept: PULMONOLOGY | Facility: CLINIC | Age: 66
End: 2021-07-13
Payer: MEDICARE

## 2021-07-13 VITALS
SYSTOLIC BLOOD PRESSURE: 114 MMHG | TEMPERATURE: 97.9 F | DIASTOLIC BLOOD PRESSURE: 69 MMHG | HEART RATE: 71 BPM | OXYGEN SATURATION: 96 %

## 2021-07-13 PROCEDURE — 99214 OFFICE O/P EST MOD 30 MIN: CPT | Mod: 25

## 2021-07-13 PROCEDURE — 36415 COLL VENOUS BLD VENIPUNCTURE: CPT

## 2021-07-13 NOTE — PHYSICAL EXAM
[No Acute Distress] : no acute distress [Well Nourished] : well nourished [Well Developed] : well developed [Well-Appearing] : well-appearing [Normal Sclera/Conjunctiva] : normal sclera/conjunctiva [PERRL] : pupils equal round and reactive to light [EOMI] : extraocular movements intact [Normal Outer Ear/Nose] : the outer ears and nose were normal in appearance [Normal Oropharynx] : the oropharynx was normal [No JVD] : no jugular venous distention [No Lymphadenopathy] : no lymphadenopathy [Supple] : supple [Thyroid Normal, No Nodules] : the thyroid was normal and there were no nodules present [No Respiratory Distress] : no respiratory distress  [No Accessory Muscle Use] : no accessory muscle use [Clear to Auscultation] : lungs were clear to auscultation bilaterally [Normal Rate] : normal rate  [Regular Rhythm] : with a regular rhythm [Normal S1, S2] : normal S1 and S2 [No Murmur] : no murmur heard [No Carotid Bruits] : no carotid bruits [No Abdominal Bruit] : a ~M bruit was not heard ~T in the abdomen [No Varicosities] : no varicosities [Pedal Pulses Present] : the pedal pulses are present [No Edema] : there was no peripheral edema [No Palpable Aorta] : no palpable aorta [No Extremity Clubbing/Cyanosis] : no extremity clubbing/cyanosis [Soft] : abdomen soft [Non Tender] : non-tender [Non-distended] : non-distended [No Masses] : no abdominal mass palpated [No HSM] : no HSM [Normal Bowel Sounds] : normal bowel sounds [Normal Posterior Cervical Nodes] : no posterior cervical lymphadenopathy [Normal Anterior Cervical Nodes] : no anterior cervical lymphadenopathy [No CVA Tenderness] : no CVA  tenderness [No Spinal Tenderness] : no spinal tenderness [No Joint Swelling] : no joint swelling [Grossly Normal Strength/Tone] : grossly normal strength/tone [No Rash] : no rash [Coordination Grossly Intact] : coordination grossly intact [No Focal Deficits] : no focal deficits [Normal Gait] : normal gait [Deep Tendon Reflexes (DTR)] : deep tendon reflexes were 2+ and symmetric [Normal Affect] : the affect was normal [Normal Insight/Judgement] : insight and judgment were intact [de-identified] : Appears comfortable

## 2021-07-13 NOTE — REVIEW OF SYSTEMS
[Dysuria] : dysuria [Joint Pain] : joint pain [Negative] : Heme/Lymph [de-identified] : Pain shooting down the left leg

## 2021-07-13 NOTE — PLAN
[FreeTextEntry1] : Venipuncture with labs drawn in office\par Medications reviewed. Continue present medications.\par Return in 6 weeks for annual physical examination.

## 2021-07-18 LAB
ALBUMIN SERPL ELPH-MCNC: 4.4 G/DL
ALP BLD-CCNC: 58 U/L
ALT SERPL-CCNC: 16 U/L
ANION GAP SERPL CALC-SCNC: 13 MMOL/L
AST SERPL-CCNC: 20 U/L
BASOPHILS # BLD AUTO: 0.07 K/UL
BASOPHILS NFR BLD AUTO: 1 %
BILIRUB SERPL-MCNC: 0.2 MG/DL
BUN SERPL-MCNC: 15 MG/DL
CALCIUM SERPL-MCNC: 10.5 MG/DL
CHLORIDE SERPL-SCNC: 103 MMOL/L
CHOLEST SERPL-MCNC: 202 MG/DL
CO2 SERPL-SCNC: 27 MMOL/L
CREAT SERPL-MCNC: 0.9 MG/DL
EOSINOPHIL # BLD AUTO: 0.19 K/UL
EOSINOPHIL NFR BLD AUTO: 2.6 %
ESTIMATED AVERAGE GLUCOSE: 111 MG/DL
GLUCOSE SERPL-MCNC: 82 MG/DL
HBA1C MFR BLD HPLC: 5.5 %
HCT VFR BLD CALC: 38.9 %
HDLC SERPL-MCNC: 55 MG/DL
HGB BLD-MCNC: 12.3 G/DL
IMM GRANULOCYTES NFR BLD AUTO: 0.3 %
LDLC SERPL CALC-MCNC: 107 MG/DL
LYMPHOCYTES # BLD AUTO: 1.64 K/UL
LYMPHOCYTES NFR BLD AUTO: 22.6 %
MAN DIFF?: NORMAL
MCHC RBC-ENTMCNC: 29.4 PG
MCHC RBC-ENTMCNC: 31.6 GM/DL
MCV RBC AUTO: 93.1 FL
MONOCYTES # BLD AUTO: 0.58 K/UL
MONOCYTES NFR BLD AUTO: 8 %
NEUTROPHILS # BLD AUTO: 4.75 K/UL
NEUTROPHILS NFR BLD AUTO: 65.5 %
NONHDLC SERPL-MCNC: 147 MG/DL
PLATELET # BLD AUTO: 229 K/UL
POTASSIUM SERPL-SCNC: 4.5 MMOL/L
PROT SERPL-MCNC: 6.9 G/DL
RBC # BLD: 4.18 M/UL
RBC # FLD: 13.9 %
SODIUM SERPL-SCNC: 143 MMOL/L
TRIGL SERPL-MCNC: 198 MG/DL
WBC # FLD AUTO: 7.25 K/UL

## 2021-08-23 ENCOUNTER — APPOINTMENT (OUTPATIENT)
Dept: PULMONOLOGY | Facility: CLINIC | Age: 66
End: 2021-08-23
Payer: MEDICARE

## 2021-08-23 PROCEDURE — 99214 OFFICE O/P EST MOD 30 MIN: CPT | Mod: 95

## 2021-08-24 ENCOUNTER — APPOINTMENT (OUTPATIENT)
Dept: PULMONOLOGY | Facility: CLINIC | Age: 66
End: 2021-08-24
Payer: MEDICARE

## 2021-08-24 PROCEDURE — 90471 IMMUNIZATION ADMIN: CPT

## 2021-08-24 PROCEDURE — 99214 OFFICE O/P EST MOD 30 MIN: CPT | Mod: 25

## 2021-08-24 PROCEDURE — 90715 TDAP VACCINE 7 YRS/> IM: CPT

## 2021-08-25 NOTE — ASSESSMENT
[FreeTextEntry1] : Start Voltaren and Flexeril as needed for back pain.\par Medications reviewed. Continue present medications.\par \par \par Spent in telehealth consultation and charting is 40 minutes.

## 2021-08-25 NOTE — PLAN
[FreeTextEntry1] : Tdap given today.\par Medications reviewed. Continue present medications.\par Return in 6 weeks for annual physical examination.\par

## 2021-08-25 NOTE — HISTORY OF PRESENT ILLNESS
[TextBox_4] : This visit was provided via telehealth using real-time 2-way audio visual technology.  The patient, OMARI KHANNA, was located at home, [address] [place] [address] at the time of the visit.  \par The provider, Emi Weber, was located at the office [facility], [facility] [address], [place] at the time of the visit. \par The patient, Ms. OMARI KHANNA  and physician Emi Weber DO, participated in the telehealth encounter.\par \par Verbal consent was obtained by the  from patient.\par

## 2021-08-25 NOTE — REVIEW OF SYSTEMS
[Dysuria] : dysuria [Joint Pain] : joint pain [Negative] : Heme/Lymph [de-identified] : Pain shooting down the left leg

## 2021-08-25 NOTE — REASON FOR VISIT
[Follow-Up] : a follow-up visit [TextBox_44] : Complains of back pain from arthritis, no history of injury, no fever or chills.

## 2021-08-25 NOTE — PHYSICAL EXAM
[No Acute Distress] : no acute distress [Well Nourished] : well nourished [Well Developed] : well developed [Well-Appearing] : well-appearing [Normal Sclera/Conjunctiva] : normal sclera/conjunctiva [PERRL] : pupils equal round and reactive to light [EOMI] : extraocular movements intact [Normal Outer Ear/Nose] : the outer ears and nose were normal in appearance [Normal Oropharynx] : the oropharynx was normal [No JVD] : no jugular venous distention [No Lymphadenopathy] : no lymphadenopathy [Supple] : supple [Thyroid Normal, No Nodules] : the thyroid was normal and there were no nodules present [No Respiratory Distress] : no respiratory distress  [No Accessory Muscle Use] : no accessory muscle use [Clear to Auscultation] : lungs were clear to auscultation bilaterally [Normal Rate] : normal rate  [Regular Rhythm] : with a regular rhythm [Normal S1, S2] : normal S1 and S2 [No Murmur] : no murmur heard [No Carotid Bruits] : no carotid bruits [No Abdominal Bruit] : a ~M bruit was not heard ~T in the abdomen [No Varicosities] : no varicosities [Pedal Pulses Present] : the pedal pulses are present [No Edema] : there was no peripheral edema [No Palpable Aorta] : no palpable aorta [No Extremity Clubbing/Cyanosis] : no extremity clubbing/cyanosis [Soft] : abdomen soft [Non Tender] : non-tender [Non-distended] : non-distended [No Masses] : no abdominal mass palpated [No HSM] : no HSM [Normal Bowel Sounds] : normal bowel sounds [Normal Posterior Cervical Nodes] : no posterior cervical lymphadenopathy [Normal Anterior Cervical Nodes] : no anterior cervical lymphadenopathy [No CVA Tenderness] : no CVA  tenderness [No Spinal Tenderness] : no spinal tenderness [No Joint Swelling] : no joint swelling [Grossly Normal Strength/Tone] : grossly normal strength/tone [No Rash] : no rash [Coordination Grossly Intact] : coordination grossly intact [No Focal Deficits] : no focal deficits [Normal Gait] : normal gait [Deep Tendon Reflexes (DTR)] : deep tendon reflexes were 2+ and symmetric [Normal Affect] : the affect was normal [Normal Insight/Judgement] : insight and judgment were intact [de-identified] : Appears comfortable

## 2021-09-28 ENCOUNTER — APPOINTMENT (OUTPATIENT)
Dept: PULMONOLOGY | Facility: CLINIC | Age: 66
End: 2021-09-28

## 2021-11-08 ENCOUNTER — APPOINTMENT (OUTPATIENT)
Dept: PULMONOLOGY | Facility: CLINIC | Age: 66
End: 2021-11-08
Payer: MEDICARE

## 2021-11-08 VITALS
HEART RATE: 84 BPM | DIASTOLIC BLOOD PRESSURE: 71 MMHG | SYSTOLIC BLOOD PRESSURE: 135 MMHG | OXYGEN SATURATION: 97 % | TEMPERATURE: 97.8 F | RESPIRATION RATE: 16 BRPM

## 2021-11-08 VITALS — SYSTOLIC BLOOD PRESSURE: 131 MMHG | DIASTOLIC BLOOD PRESSURE: 70 MMHG

## 2021-11-08 PROCEDURE — 99214 OFFICE O/P EST MOD 30 MIN: CPT | Mod: 25

## 2021-11-08 PROCEDURE — 36415 COLL VENOUS BLD VENIPUNCTURE: CPT

## 2021-11-09 NOTE — REVIEW OF SYSTEMS
[Dysuria] : dysuria [Joint Pain] : joint pain [Negative] : Heme/Lymph [de-identified] : Pain shooting down the left leg

## 2021-11-09 NOTE — PLAN
[FreeTextEntry1] : Venipuncture with labs drawn in office\par Medications reviewed. Continue present medications.\par Check mammogram breast ultrasound for history of breast nodule.  \par Return in 3 months for annual physical examination.

## 2021-11-09 NOTE — PHYSICAL EXAM
[No Acute Distress] : no acute distress [Well Nourished] : well nourished [Well Developed] : well developed [Well-Appearing] : well-appearing [Normal Sclera/Conjunctiva] : normal sclera/conjunctiva [PERRL] : pupils equal round and reactive to light [EOMI] : extraocular movements intact [Normal Outer Ear/Nose] : the outer ears and nose were normal in appearance [Normal Oropharynx] : the oropharynx was normal [No JVD] : no jugular venous distention [No Lymphadenopathy] : no lymphadenopathy [Supple] : supple [Thyroid Normal, No Nodules] : the thyroid was normal and there were no nodules present [No Respiratory Distress] : no respiratory distress  [No Accessory Muscle Use] : no accessory muscle use [Clear to Auscultation] : lungs were clear to auscultation bilaterally [Normal Rate] : normal rate  [Regular Rhythm] : with a regular rhythm [Normal S1, S2] : normal S1 and S2 [No Murmur] : no murmur heard [No Carotid Bruits] : no carotid bruits [No Abdominal Bruit] : a ~M bruit was not heard ~T in the abdomen [No Varicosities] : no varicosities [Pedal Pulses Present] : the pedal pulses are present [No Edema] : there was no peripheral edema [No Palpable Aorta] : no palpable aorta [No Extremity Clubbing/Cyanosis] : no extremity clubbing/cyanosis [Soft] : abdomen soft [Non Tender] : non-tender [Non-distended] : non-distended [No Masses] : no abdominal mass palpated [No HSM] : no HSM [Normal Bowel Sounds] : normal bowel sounds [Normal Posterior Cervical Nodes] : no posterior cervical lymphadenopathy [Normal Anterior Cervical Nodes] : no anterior cervical lymphadenopathy [No CVA Tenderness] : no CVA  tenderness [No Spinal Tenderness] : no spinal tenderness [No Joint Swelling] : no joint swelling [Grossly Normal Strength/Tone] : grossly normal strength/tone [No Rash] : no rash [Coordination Grossly Intact] : coordination grossly intact [No Focal Deficits] : no focal deficits [Normal Gait] : normal gait [Deep Tendon Reflexes (DTR)] : deep tendon reflexes were 2+ and symmetric [Normal Affect] : the affect was normal [Normal Insight/Judgement] : insight and judgment were intact [de-identified] : Appears comfortable

## 2021-11-12 LAB
ALBUMIN SERPL ELPH-MCNC: 4.2 G/DL
ALP BLD-CCNC: 53 U/L
ALT SERPL-CCNC: 19 U/L
ANION GAP SERPL CALC-SCNC: 15 MMOL/L
AST SERPL-CCNC: 32 U/L
BASOPHILS # BLD AUTO: 0.05 K/UL
BASOPHILS NFR BLD AUTO: 0.7 %
BILIRUB SERPL-MCNC: <0.2 MG/DL
BUN SERPL-MCNC: 22 MG/DL
CALCIUM SERPL-MCNC: 10.1 MG/DL
CHLORIDE SERPL-SCNC: 103 MMOL/L
CO2 SERPL-SCNC: 21 MMOL/L
CREAT SERPL-MCNC: 0.84 MG/DL
EOSINOPHIL # BLD AUTO: 0.28 K/UL
EOSINOPHIL NFR BLD AUTO: 4 %
GLUCOSE SERPL-MCNC: 108 MG/DL
HCT VFR BLD CALC: 38.8 %
HGB BLD-MCNC: 12.3 G/DL
IMM GRANULOCYTES NFR BLD AUTO: 0.3 %
LYMPHOCYTES # BLD AUTO: 1.61 K/UL
LYMPHOCYTES NFR BLD AUTO: 23 %
MAN DIFF?: NORMAL
MCHC RBC-ENTMCNC: 30.3 PG
MCHC RBC-ENTMCNC: 31.7 GM/DL
MCV RBC AUTO: 95.6 FL
MONOCYTES # BLD AUTO: 0.55 K/UL
MONOCYTES NFR BLD AUTO: 7.9 %
NEUTROPHILS # BLD AUTO: 4.48 K/UL
NEUTROPHILS NFR BLD AUTO: 64.1 %
PLATELET # BLD AUTO: 227 K/UL
POTASSIUM SERPL-SCNC: 5.8 MMOL/L
PROT SERPL-MCNC: 7.1 G/DL
RBC # BLD: 4.06 M/UL
RBC # FLD: 14 %
SODIUM SERPL-SCNC: 139 MMOL/L
WBC # FLD AUTO: 6.99 K/UL

## 2021-12-08 ENCOUNTER — RESULT REVIEW (OUTPATIENT)
Age: 66
End: 2021-12-08

## 2021-12-08 ENCOUNTER — OUTPATIENT (OUTPATIENT)
Dept: OUTPATIENT SERVICES | Facility: HOSPITAL | Age: 66
LOS: 1 days | End: 2021-12-08
Payer: MEDICARE

## 2021-12-08 ENCOUNTER — APPOINTMENT (OUTPATIENT)
Dept: MAMMOGRAPHY | Facility: IMAGING CENTER | Age: 66
End: 2021-12-08
Payer: MEDICARE

## 2021-12-08 ENCOUNTER — APPOINTMENT (OUTPATIENT)
Dept: ULTRASOUND IMAGING | Facility: IMAGING CENTER | Age: 66
End: 2021-12-08
Payer: MEDICARE

## 2021-12-08 DIAGNOSIS — I10 ESSENTIAL (PRIMARY) HYPERTENSION: ICD-10-CM

## 2021-12-08 DIAGNOSIS — N63.0 UNSPECIFIED LUMP IN UNSPECIFIED BREAST: ICD-10-CM

## 2021-12-08 PROCEDURE — 77063 BREAST TOMOSYNTHESIS BI: CPT | Mod: 26

## 2021-12-08 PROCEDURE — 76641 ULTRASOUND BREAST COMPLETE: CPT | Mod: 26,50

## 2021-12-08 PROCEDURE — 77063 BREAST TOMOSYNTHESIS BI: CPT

## 2021-12-08 PROCEDURE — 77067 SCR MAMMO BI INCL CAD: CPT | Mod: 26

## 2021-12-08 PROCEDURE — 76641 ULTRASOUND BREAST COMPLETE: CPT

## 2021-12-08 PROCEDURE — 77067 SCR MAMMO BI INCL CAD: CPT

## 2022-01-05 ENCOUNTER — APPOINTMENT (OUTPATIENT)
Dept: PULMONOLOGY | Facility: CLINIC | Age: 67
End: 2022-01-05
Payer: MEDICARE

## 2022-01-05 VITALS
HEIGHT: 65 IN | SYSTOLIC BLOOD PRESSURE: 130 MMHG | HEART RATE: 105 BPM | BODY MASS INDEX: 27.49 KG/M2 | DIASTOLIC BLOOD PRESSURE: 77 MMHG | WEIGHT: 165 LBS | OXYGEN SATURATION: 97 % | TEMPERATURE: 98.4 F

## 2022-01-05 PROCEDURE — 36415 COLL VENOUS BLD VENIPUNCTURE: CPT

## 2022-01-05 PROCEDURE — 99214 OFFICE O/P EST MOD 30 MIN: CPT | Mod: 25

## 2022-01-05 NOTE — REVIEW OF SYSTEMS
[Sore Throat] : sore throat [Dysuria] : dysuria [Joint Pain] : joint pain [Negative] : Heme/Lymph [de-identified] : Pain shooting down the left leg

## 2022-01-05 NOTE — PLAN
[FreeTextEntry1] : Venipuncture with labs drawn in office\par Levaquin for 5 days for URI.\par Well hydration and rest\par Medications reviewed. Continue present medications.\par GYN oncology follow-up at [facility] [place].  \par Return in 1 month for annual physical.

## 2022-01-05 NOTE — PHYSICAL EXAM
[No Acute Distress] : no acute distress [Well Nourished] : well nourished [Well Developed] : well developed [Well-Appearing] : well-appearing [Normal Sclera/Conjunctiva] : normal sclera/conjunctiva [PERRL] : pupils equal round and reactive to light [EOMI] : extraocular movements intact [Normal Outer Ear/Nose] : the outer ears and nose were normal in appearance [Normal Oropharynx] : the oropharynx was normal [No Lymphadenopathy] : no lymphadenopathy [Supple] : supple [Thyroid Normal, No Nodules] : the thyroid was normal and there were no nodules present [No Respiratory Distress] : no respiratory distress  [No Accessory Muscle Use] : no accessory muscle use [Clear to Auscultation] : lungs were clear to auscultation bilaterally [Normal Rate] : normal rate  [Regular Rhythm] : with a regular rhythm [Normal S1, S2] : normal S1 and S2 [No Murmur] : no murmur heard [No Carotid Bruits] : no carotid bruits [No Abdominal Bruit] : a ~M bruit was not heard ~T in the abdomen [No Varicosities] : no varicosities [Pedal Pulses Present] : the pedal pulses are present [No Edema] : there was no peripheral edema [No Palpable Aorta] : no palpable aorta [No Extremity Clubbing/Cyanosis] : no extremity clubbing/cyanosis [Soft] : abdomen soft [Non Tender] : non-tender [Non-distended] : non-distended [No Masses] : no abdominal mass palpated [No HSM] : no HSM [Normal Bowel Sounds] : normal bowel sounds [Normal Posterior Cervical Nodes] : no posterior cervical lymphadenopathy [Normal Anterior Cervical Nodes] : no anterior cervical lymphadenopathy [No CVA Tenderness] : no CVA  tenderness [No Spinal Tenderness] : no spinal tenderness [No Joint Swelling] : no joint swelling [Grossly Normal Strength/Tone] : grossly normal strength/tone [No Rash] : no rash [Coordination Grossly Intact] : coordination grossly intact [No Focal Deficits] : no focal deficits [Normal Gait] : normal gait [Normal Affect] : the affect was normal [Deep Tendon Reflexes (DTR)] : deep tendon reflexes were 2+ and symmetric [Normal Insight/Judgement] : insight and judgment were intact [de-identified] : Appears comfortable [de-identified] : Palpable tenderness over trachea

## 2022-01-07 LAB
25(OH)D3 SERPL-MCNC: 63.3 NG/ML
ALBUMIN SERPL ELPH-MCNC: 4.1 G/DL
ALP BLD-CCNC: 67 U/L
ALT SERPL-CCNC: 13 U/L
ANION GAP SERPL CALC-SCNC: 16 MMOL/L
AST SERPL-CCNC: 16 U/L
BASOPHILS # BLD AUTO: 0.04 K/UL
BASOPHILS NFR BLD AUTO: 0.4 %
BILIRUB SERPL-MCNC: 0.2 MG/DL
BUN SERPL-MCNC: 15 MG/DL
CALCIUM SERPL-MCNC: 9.9 MG/DL
CHLORIDE SERPL-SCNC: 102 MMOL/L
CHOLEST SERPL-MCNC: 139 MG/DL
CO2 SERPL-SCNC: 23 MMOL/L
CREAT SERPL-MCNC: 0.88 MG/DL
EOSINOPHIL # BLD AUTO: 0.03 K/UL
EOSINOPHIL NFR BLD AUTO: 0.3 %
GLUCOSE SERPL-MCNC: 90 MG/DL
HCT VFR BLD CALC: 36.4 %
HDLC SERPL-MCNC: 49 MG/DL
HGB BLD-MCNC: 11.4 G/DL
IMM GRANULOCYTES NFR BLD AUTO: 0.2 %
LDLC SERPL CALC-MCNC: 74 MG/DL
LYMPHOCYTES # BLD AUTO: 1.43 K/UL
LYMPHOCYTES NFR BLD AUTO: 15.2 %
MAN DIFF?: NORMAL
MCHC RBC-ENTMCNC: 28.6 PG
MCHC RBC-ENTMCNC: 31.3 GM/DL
MCV RBC AUTO: 91.2 FL
MONOCYTES # BLD AUTO: 0.73 K/UL
MONOCYTES NFR BLD AUTO: 7.8 %
NEUTROPHILS # BLD AUTO: 7.16 K/UL
NEUTROPHILS NFR BLD AUTO: 76.1 %
NONHDLC SERPL-MCNC: 91 MG/DL
PLATELET # BLD AUTO: 340 K/UL
POTASSIUM SERPL-SCNC: 4.7 MMOL/L
PROT SERPL-MCNC: 7.1 G/DL
RBC # BLD: 3.99 M/UL
RBC # FLD: 12.7 %
SODIUM SERPL-SCNC: 140 MMOL/L
TRIGL SERPL-MCNC: 84 MG/DL
TSH SERPL-ACNC: 0.02 UIU/ML
WBC # FLD AUTO: 9.41 K/UL

## 2022-01-14 ENCOUNTER — APPOINTMENT (OUTPATIENT)
Dept: PULMONOLOGY | Facility: CLINIC | Age: 67
End: 2022-01-14
Payer: MEDICARE

## 2022-01-14 VITALS
HEART RATE: 115 BPM | TEMPERATURE: 96.8 F | RESPIRATION RATE: 16 BRPM | SYSTOLIC BLOOD PRESSURE: 119 MMHG | OXYGEN SATURATION: 95 % | DIASTOLIC BLOOD PRESSURE: 71 MMHG

## 2022-01-14 DIAGNOSIS — R06.00 DYSPNEA, UNSPECIFIED: ICD-10-CM

## 2022-01-14 PROCEDURE — 71046 X-RAY EXAM CHEST 2 VIEWS: CPT

## 2022-01-14 PROCEDURE — 99214 OFFICE O/P EST MOD 30 MIN: CPT | Mod: 25

## 2022-01-24 NOTE — PLAN
[FreeTextEntry1] : Finish Levaquin.\par Start prednisone taper \par If cervical lymph node continues to be painful, advised patient to see hematology oncology then, to rule out recurrent lymphoma.\par

## 2022-01-24 NOTE — DATA REVIEWED
[FreeTextEntry1] : \par  Xray Chest 2 Views PA/Lat             Final\par \par No Documents Attached\par \par \par \par \par   \par Chest x-ray PA and lateral views performed in my office today showed clear lungs, no evidence of infiltrates or pleural effusions. \par \par \par  Ordered by: CHELSEA RANDALL       Collected/Examined: 14Jan2022 02:54PM       \par Verification Required       Stage: Final       \par  Performed at: In Office       Performed by: CHELSEA RANDALL       Resulted: 14Jan2022 02:54PM       Last Updated: 14Jan2022 02:55PM

## 2022-01-24 NOTE — REVIEW OF SYSTEMS
[Negative] : Heme/Lymph [Shortness Of Breath] : shortness of breath [FreeTextEntry2] : see HPI [FreeTextEntry6] : see HPI

## 2022-01-24 NOTE — HISTORY OF PRESENT ILLNESS
[FreeTextEntry1] : Persistent sweating, shortness of breath, and swollen glands  [de-identified] : Took Levaquin only twice because it has been causing nausea and diarrhea \par \par Has had constant SOB on exertion, sweating, loss of appetite, and chills; denies fever or chest tightness

## 2022-02-11 ENCOUNTER — APPOINTMENT (OUTPATIENT)
Dept: PULMONOLOGY | Facility: CLINIC | Age: 67
End: 2022-02-11

## 2022-03-14 ENCOUNTER — APPOINTMENT (OUTPATIENT)
Dept: PULMONOLOGY | Facility: CLINIC | Age: 67
End: 2022-03-14

## 2022-03-25 LAB
ALBUMIN SERPL ELPH-MCNC: 4.6 G/DL
ALP BLD-CCNC: 62 U/L
ALT SERPL-CCNC: 14 U/L
ANION GAP SERPL CALC-SCNC: 13 MMOL/L
AST SERPL-CCNC: 19 U/L
BASOPHILS # BLD AUTO: 0.07 K/UL
BASOPHILS NFR BLD AUTO: 1.1 %
BILIRUB SERPL-MCNC: 0.2 MG/DL
BUN SERPL-MCNC: 15 MG/DL
CALCIUM SERPL-MCNC: 9.9 MG/DL
CHLORIDE SERPL-SCNC: 105 MMOL/L
CHOLEST SERPL-MCNC: 206 MG/DL
CO2 SERPL-SCNC: 24 MMOL/L
CREAT SERPL-MCNC: 0.78 MG/DL
EGFR: 83 ML/MIN/1.73M2
EOSINOPHIL # BLD AUTO: 0.16 K/UL
EOSINOPHIL NFR BLD AUTO: 2.4 %
ESTIMATED AVERAGE GLUCOSE: 105 MG/DL
GLUCOSE SERPL-MCNC: 95 MG/DL
HBA1C MFR BLD HPLC: 5.3 %
HCT VFR BLD CALC: 40 %
HDLC SERPL-MCNC: 63 MG/DL
HGB BLD-MCNC: 12.5 G/DL
IMM GRANULOCYTES NFR BLD AUTO: 0.3 %
LDLC SERPL CALC-MCNC: 111 MG/DL
LYMPHOCYTES # BLD AUTO: 1.54 K/UL
LYMPHOCYTES NFR BLD AUTO: 23.3 %
MAN DIFF?: NORMAL
MCHC RBC-ENTMCNC: 29 PG
MCHC RBC-ENTMCNC: 31.3 GM/DL
MCV RBC AUTO: 92.8 FL
MONOCYTES # BLD AUTO: 0.34 K/UL
MONOCYTES NFR BLD AUTO: 5.1 %
NEUTROPHILS # BLD AUTO: 4.48 K/UL
NEUTROPHILS NFR BLD AUTO: 67.8 %
NONHDLC SERPL-MCNC: 143 MG/DL
PLATELET # BLD AUTO: 254 K/UL
POTASSIUM SERPL-SCNC: 4.8 MMOL/L
PROT SERPL-MCNC: 7.1 G/DL
RBC # BLD: 4.31 M/UL
RBC # FLD: 16 %
SODIUM SERPL-SCNC: 142 MMOL/L
T4 FREE SERPL-MCNC: 0.7 NG/DL
TRIGL SERPL-MCNC: 159 MG/DL
TSH SERPL-ACNC: 10.7 UIU/ML
WBC # FLD AUTO: 6.61 K/UL

## 2022-04-04 ENCOUNTER — NON-APPOINTMENT (OUTPATIENT)
Age: 67
End: 2022-04-04

## 2022-05-27 ENCOUNTER — APPOINTMENT (OUTPATIENT)
Dept: INTERNAL MEDICINE | Facility: CLINIC | Age: 67
End: 2022-05-27

## 2022-06-07 ENCOUNTER — APPOINTMENT (OUTPATIENT)
Dept: PULMONOLOGY | Facility: CLINIC | Age: 67
End: 2022-06-07
Payer: MEDICARE

## 2022-06-07 VITALS
SYSTOLIC BLOOD PRESSURE: 127 MMHG | HEART RATE: 78 BPM | TEMPERATURE: 98.1 F | OXYGEN SATURATION: 94 % | DIASTOLIC BLOOD PRESSURE: 67 MMHG

## 2022-06-07 PROCEDURE — 36415 COLL VENOUS BLD VENIPUNCTURE: CPT

## 2022-06-07 PROCEDURE — 99214 OFFICE O/P EST MOD 30 MIN: CPT | Mod: 25

## 2022-06-07 NOTE — PLAN
[FreeTextEntry1] : Venipuncture with labs drawn in office\par Medications reviewed. Continue present medications.\par Continue levothyroxine for hypothyroidism.\par Check mammogram and breast ultrasound for further evaluation.\par Return in 1 month for annual physical examination.

## 2022-06-12 LAB
25(OH)D3 SERPL-MCNC: 51.4 NG/ML
ALBUMIN SERPL ELPH-MCNC: 4.5 G/DL
ALP BLD-CCNC: 49 U/L
ALT SERPL-CCNC: 19 U/L
ANION GAP SERPL CALC-SCNC: 15 MMOL/L
AST SERPL-CCNC: 20 U/L
BILIRUB SERPL-MCNC: 0.2 MG/DL
BUN SERPL-MCNC: 19 MG/DL
CALCIUM SERPL-MCNC: 10 MG/DL
CHLORIDE SERPL-SCNC: 105 MMOL/L
CHOLEST SERPL-MCNC: 223 MG/DL
CO2 SERPL-SCNC: 24 MMOL/L
CREAT SERPL-MCNC: 0.85 MG/DL
EGFR: 75 ML/MIN/1.73M2
GLUCOSE SERPL-MCNC: 91 MG/DL
HDLC SERPL-MCNC: 58 MG/DL
LDLC SERPL CALC-MCNC: 122 MG/DL
NONHDLC SERPL-MCNC: 165 MG/DL
POTASSIUM SERPL-SCNC: 4.7 MMOL/L
PROT SERPL-MCNC: 7 G/DL
SODIUM SERPL-SCNC: 143 MMOL/L
T4 FREE SERPL-MCNC: 1 NG/DL
TRIGL SERPL-MCNC: 214 MG/DL
TSH SERPL-ACNC: 6.59 UIU/ML

## 2022-08-04 ENCOUNTER — APPOINTMENT (OUTPATIENT)
Dept: OBGYN | Facility: CLINIC | Age: 67
End: 2022-08-04

## 2022-08-04 DIAGNOSIS — N76.4 ABSCESS OF VULVA: ICD-10-CM

## 2022-08-04 PROCEDURE — 99213 OFFICE O/P EST LOW 20 MIN: CPT

## 2022-08-11 ENCOUNTER — APPOINTMENT (OUTPATIENT)
Dept: PULMONOLOGY | Facility: CLINIC | Age: 67
End: 2022-08-11

## 2022-08-11 VITALS
HEART RATE: 81 BPM | RESPIRATION RATE: 79 BRPM | DIASTOLIC BLOOD PRESSURE: 72 MMHG | OXYGEN SATURATION: 95 % | TEMPERATURE: 97.1 F | SYSTOLIC BLOOD PRESSURE: 118 MMHG

## 2022-08-11 PROCEDURE — 36415 COLL VENOUS BLD VENIPUNCTURE: CPT

## 2022-08-11 PROCEDURE — 99214 OFFICE O/P EST MOD 30 MIN: CPT | Mod: CS,25

## 2022-08-11 RX ORDER — SULFAMETHOXAZOLE AND TRIMETHOPRIM 800; 160 MG/1; MG/1
800-160 TABLET ORAL TWICE DAILY
Qty: 10 | Refills: 0 | Status: COMPLETED | COMMUNITY
Start: 2020-09-22 | End: 2022-08-11

## 2022-08-11 RX ORDER — NITROFURANTOIN (MONOHYDRATE/MACROCRYSTALS) 25; 75 MG/1; MG/1
100 CAPSULE ORAL
Qty: 14 | Refills: 0 | Status: COMPLETED | COMMUNITY
Start: 2020-10-12 | End: 2022-08-11

## 2022-08-11 RX ORDER — SULFAMETHOXAZOLE AND TRIMETHOPRIM 800; 160 MG/1; MG/1
800-160 TABLET ORAL TWICE DAILY
Qty: 10 | Refills: 0 | Status: COMPLETED | COMMUNITY
Start: 2019-12-16 | End: 2022-08-11

## 2022-08-11 RX ORDER — DOXYCYCLINE HYCLATE 100 MG/1
100 TABLET ORAL
Qty: 10 | Refills: 0 | Status: COMPLETED | COMMUNITY
Start: 2022-01-07 | End: 2022-08-11

## 2022-08-11 RX ORDER — AZITHROMYCIN 250 MG/1
250 TABLET, FILM COATED ORAL
Qty: 6 | Refills: 1 | Status: COMPLETED | COMMUNITY
Start: 2020-02-04 | End: 2022-08-11

## 2022-08-11 RX ORDER — CYCLOBENZAPRINE HYDROCHLORIDE 5 MG/1
5 TABLET, FILM COATED ORAL 3 TIMES DAILY
Qty: 90 | Refills: 3 | Status: COMPLETED | COMMUNITY
Start: 2021-08-23 | End: 2022-08-11

## 2022-08-11 RX ORDER — PREDNISONE 10 MG/1
10 TABLET ORAL
Qty: 18 | Refills: 0 | Status: COMPLETED | COMMUNITY
Start: 2020-09-22 | End: 2022-08-11

## 2022-08-11 RX ORDER — LEVOFLOXACIN 500 MG/1
500 TABLET, FILM COATED ORAL DAILY
Qty: 5 | Refills: 0 | Status: COMPLETED | COMMUNITY
Start: 2022-01-05 | End: 2022-08-11

## 2022-08-11 RX ORDER — FLUCONAZOLE 150 MG/1
150 TABLET ORAL
Qty: 1 | Refills: 1 | Status: COMPLETED | COMMUNITY
Start: 2019-12-16 | End: 2022-08-11

## 2022-08-11 RX ORDER — AZITHROMYCIN 250 MG/1
250 TABLET, FILM COATED ORAL
Qty: 1 | Refills: 0 | Status: COMPLETED | COMMUNITY
Start: 2022-01-07 | End: 2022-08-11

## 2022-08-11 RX ORDER — AMOXICILLIN 500 MG/1
500 CAPSULE ORAL
Qty: 16 | Refills: 0 | Status: COMPLETED | COMMUNITY
Start: 2022-06-08

## 2022-08-11 RX ORDER — LEVOFLOXACIN 500 MG/1
500 TABLET, FILM COATED ORAL DAILY
Qty: 7 | Refills: 0 | Status: COMPLETED | COMMUNITY
Start: 2022-06-24 | End: 2022-08-11

## 2022-08-11 RX ORDER — METHYLPREDNISOLONE 4 MG/1
4 TABLET ORAL
Qty: 1 | Refills: 0 | Status: COMPLETED | COMMUNITY
Start: 2022-01-12 | End: 2022-08-11

## 2022-08-11 RX ORDER — DICLOFENAC SODIUM 75 MG/1
75 TABLET, DELAYED RELEASE ORAL TWICE DAILY
Qty: 60 | Refills: 2 | Status: COMPLETED | COMMUNITY
Start: 2021-08-23 | End: 2022-08-11

## 2022-08-13 NOTE — HISTORY OF PRESENT ILLNESS
[FreeTextEntry1] : F/u  [de-identified] : Overall feeling well; no complaints at this time \par \par Had COVID at the end of March

## 2022-08-13 NOTE — PLAN
[FreeTextEntry1] : Venipuncture with labs drawn in office\par Continue levothyroxine for history of hypothyroidism.\par

## 2022-08-15 LAB
ALBUMIN SERPL ELPH-MCNC: 4.4 G/DL
ALP BLD-CCNC: 50 U/L
ALT SERPL-CCNC: 19 U/L
ANION GAP SERPL CALC-SCNC: 13 MMOL/L
AST SERPL-CCNC: 23 U/L
BASOPHILS # BLD AUTO: 0.06 K/UL
BASOPHILS NFR BLD AUTO: 1 %
BILIRUB SERPL-MCNC: 0.2 MG/DL
BUN SERPL-MCNC: 21 MG/DL
CALCIUM SERPL-MCNC: 10.1 MG/DL
CHLORIDE SERPL-SCNC: 104 MMOL/L
CO2 SERPL-SCNC: 25 MMOL/L
CREAT SERPL-MCNC: 0.88 MG/DL
CRP SERPL-MCNC: 6 MG/L
DEPRECATED D DIMER PPP IA-ACNC: <150 NG/ML DDU
EGFR: 72 ML/MIN/1.73M2
EOSINOPHIL # BLD AUTO: 0.18 K/UL
EOSINOPHIL NFR BLD AUTO: 3.1 %
FERRITIN SERPL-MCNC: 126 NG/ML
GLUCOSE SERPL-MCNC: 100 MG/DL
HCT VFR BLD CALC: 38.2 %
HGB BLD-MCNC: 12.5 G/DL
IMM GRANULOCYTES NFR BLD AUTO: 0.2 %
LYMPHOCYTES # BLD AUTO: 1.54 K/UL
LYMPHOCYTES NFR BLD AUTO: 26.3 %
MAN DIFF?: NORMAL
MCHC RBC-ENTMCNC: 30.4 PG
MCHC RBC-ENTMCNC: 32.7 GM/DL
MCV RBC AUTO: 92.9 FL
MONOCYTES # BLD AUTO: 0.42 K/UL
MONOCYTES NFR BLD AUTO: 7.2 %
NEUTROPHILS # BLD AUTO: 3.65 K/UL
NEUTROPHILS NFR BLD AUTO: 62.2 %
PLATELET # BLD AUTO: 232 K/UL
POTASSIUM SERPL-SCNC: 4.7 MMOL/L
PROCALCITONIN SERPL-MCNC: 0.04 NG/ML
PROT SERPL-MCNC: 6.9 G/DL
RBC # BLD: 4.11 M/UL
RBC # FLD: 13.9 %
SODIUM SERPL-SCNC: 142 MMOL/L
WBC # FLD AUTO: 5.86 K/UL

## 2022-09-18 NOTE — REVIEW OF SYSTEMS
[Dysuria] : dysuria [Joint Pain] : joint pain [Negative] : Heme/Lymph [de-identified] : Pain shooting down the left leg

## 2022-10-12 ENCOUNTER — APPOINTMENT (OUTPATIENT)
Dept: PULMONOLOGY | Facility: CLINIC | Age: 67
End: 2022-10-12

## 2022-10-12 VITALS
DIASTOLIC BLOOD PRESSURE: 71 MMHG | HEART RATE: 78 BPM | OXYGEN SATURATION: 97 % | SYSTOLIC BLOOD PRESSURE: 115 MMHG | RESPIRATION RATE: 16 BRPM

## 2022-10-12 DIAGNOSIS — Z23 ENCOUNTER FOR IMMUNIZATION: ICD-10-CM

## 2022-10-12 PROCEDURE — G0008: CPT

## 2022-10-12 PROCEDURE — 90662 IIV NO PRSV INCREASED AG IM: CPT

## 2022-11-02 RX ORDER — CIPROFLOXACIN HYDROCHLORIDE 500 MG/1
500 TABLET, FILM COATED ORAL
Qty: 14 | Refills: 0 | Status: COMPLETED | COMMUNITY
Start: 2022-11-02 | End: 2022-11-09

## 2022-11-29 ENCOUNTER — APPOINTMENT (OUTPATIENT)
Dept: PULMONOLOGY | Facility: CLINIC | Age: 67
End: 2022-11-29

## 2022-12-09 ENCOUNTER — OUTPATIENT (OUTPATIENT)
Dept: OUTPATIENT SERVICES | Facility: HOSPITAL | Age: 67
LOS: 1 days | End: 2022-12-09
Payer: MEDICARE

## 2022-12-09 ENCOUNTER — RESULT REVIEW (OUTPATIENT)
Age: 67
End: 2022-12-09

## 2022-12-09 ENCOUNTER — APPOINTMENT (OUTPATIENT)
Dept: MAMMOGRAPHY | Facility: IMAGING CENTER | Age: 67
End: 2022-12-09

## 2022-12-09 ENCOUNTER — APPOINTMENT (OUTPATIENT)
Dept: ULTRASOUND IMAGING | Facility: IMAGING CENTER | Age: 67
End: 2022-12-09

## 2022-12-09 DIAGNOSIS — Z00.8 ENCOUNTER FOR OTHER GENERAL EXAMINATION: ICD-10-CM

## 2022-12-09 PROCEDURE — 77067 SCR MAMMO BI INCL CAD: CPT | Mod: 26

## 2022-12-09 PROCEDURE — 77063 BREAST TOMOSYNTHESIS BI: CPT | Mod: 26

## 2022-12-09 PROCEDURE — 77063 BREAST TOMOSYNTHESIS BI: CPT

## 2022-12-09 PROCEDURE — 76641 ULTRASOUND BREAST COMPLETE: CPT | Mod: 26,50

## 2022-12-09 PROCEDURE — 76641 ULTRASOUND BREAST COMPLETE: CPT

## 2022-12-09 PROCEDURE — 77067 SCR MAMMO BI INCL CAD: CPT

## 2023-01-04 ENCOUNTER — APPOINTMENT (OUTPATIENT)
Dept: PULMONOLOGY | Facility: CLINIC | Age: 68
End: 2023-01-04
Payer: MEDICARE

## 2023-01-04 VITALS — DIASTOLIC BLOOD PRESSURE: 63 MMHG | OXYGEN SATURATION: 97 % | SYSTOLIC BLOOD PRESSURE: 101 MMHG | HEART RATE: 67 BPM

## 2023-01-04 PROCEDURE — G0009: CPT

## 2023-01-04 PROCEDURE — 90677 PCV20 VACCINE IM: CPT

## 2023-01-05 ENCOUNTER — NON-APPOINTMENT (OUTPATIENT)
Age: 68
End: 2023-01-05

## 2023-01-05 NOTE — ASSESSMENT
[FreeTextEntry1] : Prevnar 20 vaccine given today.\par Return in 2 months for annual physical examination.

## 2023-01-26 ENCOUNTER — RX RENEWAL (OUTPATIENT)
Age: 68
End: 2023-01-26

## 2023-02-25 ENCOUNTER — NON-APPOINTMENT (OUTPATIENT)
Age: 68
End: 2023-02-25

## 2023-02-25 DIAGNOSIS — N39.0 URINARY TRACT INFECTION, SITE NOT SPECIFIED: ICD-10-CM

## 2023-02-25 RX ORDER — PHENAZOPYRIDINE 100 MG/1
100 TABLET, FILM COATED ORAL 3 TIMES DAILY
Qty: 15 | Refills: 0 | Status: ACTIVE | COMMUNITY
Start: 2023-02-25 | End: 1900-01-01

## 2023-03-16 ENCOUNTER — LABORATORY RESULT (OUTPATIENT)
Age: 68
End: 2023-03-16

## 2023-03-17 ENCOUNTER — NON-APPOINTMENT (OUTPATIENT)
Age: 68
End: 2023-03-17

## 2023-03-17 ENCOUNTER — APPOINTMENT (OUTPATIENT)
Dept: PULMONOLOGY | Facility: CLINIC | Age: 68
End: 2023-03-17
Payer: MEDICARE

## 2023-03-17 VITALS — OXYGEN SATURATION: 97 % | DIASTOLIC BLOOD PRESSURE: 57 MMHG | SYSTOLIC BLOOD PRESSURE: 120 MMHG | HEART RATE: 83 BPM

## 2023-03-17 DIAGNOSIS — M85.80 OTHER SPECIFIED DISORDERS OF BONE DENSITY AND STRUCTURE, UNSPECIFIED SITE: ICD-10-CM

## 2023-03-17 DIAGNOSIS — C78.00 SECONDARY MALIGNANT NEOPLASM OF UNSPECIFIED LUNG: ICD-10-CM

## 2023-03-17 PROCEDURE — 36415 COLL VENOUS BLD VENIPUNCTURE: CPT

## 2023-03-17 PROCEDURE — 99214 OFFICE O/P EST MOD 30 MIN: CPT | Mod: 25

## 2023-03-17 PROCEDURE — 93000 ELECTROCARDIOGRAM COMPLETE: CPT | Mod: 59

## 2023-03-19 PROBLEM — C78.00 SECONDARY MALIGNANT NEOPLASM OF LUNG: Status: ACTIVE | Noted: 2018-07-20

## 2023-03-19 PROBLEM — M85.80 OSTEOPENIA: Status: ACTIVE | Noted: 2019-04-26

## 2023-03-19 LAB
25(OH)D3 SERPL-MCNC: 58.7 NG/ML
ALBUMIN SERPL ELPH-MCNC: 4.8 G/DL
ALP BLD-CCNC: 59 U/L
ALT SERPL-CCNC: 18 U/L
ANION GAP SERPL CALC-SCNC: 14 MMOL/L
AST SERPL-CCNC: 22 U/L
BASOPHILS # BLD AUTO: 0.08 K/UL
BASOPHILS NFR BLD AUTO: 1.1 %
BILIRUB SERPL-MCNC: 0.2 MG/DL
BUN SERPL-MCNC: 18 MG/DL
CALCIUM SERPL-MCNC: 10.4 MG/DL
CHLORIDE SERPL-SCNC: 103 MMOL/L
CHOLEST SERPL-MCNC: 221 MG/DL
CO2 SERPL-SCNC: 25 MMOL/L
CREAT SERPL-MCNC: 0.87 MG/DL
EGFR: 72 ML/MIN/1.73M2
EOSINOPHIL # BLD AUTO: 0.15 K/UL
EOSINOPHIL NFR BLD AUTO: 2 %
GLUCOSE SERPL-MCNC: 100 MG/DL
HCT VFR BLD CALC: 43 %
HCV AB SER QL: NONREACTIVE
HCV S/CO RATIO: 0.07 S/CO
HDLC SERPL-MCNC: 60 MG/DL
HGB BLD-MCNC: 13 G/DL
IMM GRANULOCYTES NFR BLD AUTO: 0.3 %
LDLC SERPL CALC-MCNC: 115 MG/DL
LYMPHOCYTES # BLD AUTO: 1.55 K/UL
LYMPHOCYTES NFR BLD AUTO: 20.9 %
MAGNESIUM SERPL-MCNC: 2.4 MG/DL
MAN DIFF?: NORMAL
MCHC RBC-ENTMCNC: 29.5 PG
MCHC RBC-ENTMCNC: 30.2 GM/DL
MCV RBC AUTO: 97.7 FL
MONOCYTES # BLD AUTO: 0.5 K/UL
MONOCYTES NFR BLD AUTO: 6.7 %
NEUTROPHILS # BLD AUTO: 5.12 K/UL
NEUTROPHILS NFR BLD AUTO: 69 %
NONHDLC SERPL-MCNC: 161 MG/DL
PHOSPHATE SERPL-MCNC: 3.5 MG/DL
PLATELET # BLD AUTO: 262 K/UL
POTASSIUM SERPL-SCNC: 4.9 MMOL/L
PROT SERPL-MCNC: 7.3 G/DL
RBC # BLD: 4.4 M/UL
RBC # FLD: 13.8 %
SODIUM SERPL-SCNC: 143 MMOL/L
T3 SERPL-MCNC: 102 NG/DL
T3RU NFR SERPL: 1.1 TBI
T4 FREE SERPL-MCNC: 1.3 NG/DL
T4 SERPL-MCNC: 9.1 UG/DL
TRIGL SERPL-MCNC: 228 MG/DL
TSH SERPL-ACNC: 3.12 UIU/ML
WBC # FLD AUTO: 7.42 K/UL

## 2023-03-19 NOTE — ASSESSMENT
[FreeTextEntry1] : Ms. OMARI KHANNA is an 68 year old female, history of benign hypertension, hypothyroidism, osteopenia. Patient appears stable.

## 2023-03-19 NOTE — PLAN
[FreeTextEntry1] : Age appropriate preventative care counseling discussed with patient.\par Venipuncture with labs drawn in office.\par Obtained and reviewed EKG with patient today.\par Advised patient to follow up with [facility] for her kidney/cancer issues.\par Advised patient to follow up with Dr. Mervin Benton, Cardiologist, to receive an echocardiogram.\par Return for follow up in 6 weeks.

## 2023-03-19 NOTE — HISTORY OF PRESENT ILLNESS
[FreeTextEntry1] : Annual wellness visit.  [de-identified] : OMARI KHANNA is a 68 year old female, with history of benign hypertension, hypothyroidism, osteopenia, who presents to the office for an annual wellness visit. Patient reports that she is feeling well overall with no respiratory complaints. She states of having a UTI 6 days ago. Patient reports that she visited her Cardiologist last week and was told that she did not require to receive an echocardiogram.

## 2023-03-19 NOTE — ADDENDUM
[FreeTextEntry1] : I, Grace Nealdamion, acted solely as a scribe for Dr. Emi Weber D.O., on this date 03/17/2023. \par \par All medical record entries made by the Scribe were at my, Dr. Emi Weber D.O., direction and personally dictated by me on 03/17/2023. I have reviewed the chart and agree that the record accurately reflects my personal performance of the history, physical exam, assessment and plan. I have also personally directed, reviewed, and agreed with the chart.

## 2023-04-10 ENCOUNTER — OFFICE (OUTPATIENT)
Dept: URBAN - METROPOLITAN AREA CLINIC 90 | Facility: CLINIC | Age: 68
Setting detail: OPHTHALMOLOGY
End: 2023-04-10
Payer: MEDICARE

## 2023-04-10 DIAGNOSIS — H17.9: ICD-10-CM

## 2023-04-10 DIAGNOSIS — H40.013: ICD-10-CM

## 2023-04-10 DIAGNOSIS — H25.13: ICD-10-CM

## 2023-04-10 DIAGNOSIS — H31.002: ICD-10-CM

## 2023-04-10 DIAGNOSIS — H02.821: ICD-10-CM

## 2023-04-10 DIAGNOSIS — H43.391: ICD-10-CM

## 2023-04-10 PROCEDURE — 92014 COMPRE OPH EXAM EST PT 1/>: CPT | Performed by: OPHTHALMOLOGY

## 2023-04-10 PROCEDURE — 92250 FUNDUS PHOTOGRAPHY W/I&R: CPT | Performed by: OPHTHALMOLOGY

## 2023-04-10 PROCEDURE — 92083 EXTENDED VISUAL FIELD XM: CPT | Performed by: OPHTHALMOLOGY

## 2023-04-10 ASSESSMENT — AXIALLENGTH_DERIVED
OS_AL: 24.1008
OD_AL: 23.811
OS_AL: 23.8992
OS_AL: 24.1008
OD_AL: 23.4687
OD_AL: 23.9107
OD_AL: 23.6142
OD_AL: 23.811
OS_AL: 23.9996
OS_AL: 23.8493

## 2023-04-10 ASSESSMENT — SPHEQUIV_DERIVED
OD_SPHEQUIV: -1
OS_SPHEQUIV: -1.125
OS_SPHEQUIV: -1.5
OS_SPHEQUIV: -1.25
OD_SPHEQUIV: -1.5
OD_SPHEQUIV: -1.5
OS_SPHEQUIV: -1.75
OD_SPHEQUIV: -0.625
OD_SPHEQUIV: -1.75
OS_SPHEQUIV: -1.75

## 2023-04-10 ASSESSMENT — REFRACTION_CURRENTRX
OS_VPRISM_DIRECTION: SV
OS_AXIS: 088
OD_SPHERE: -1.25
OD_SPHERE: -1.25
OS_OVR_VA: 20/
OS_OVR_VA: 20/
OD_AXIS: 080
OD_CYLINDER: -0.50
OD_CYLINDER: -0.50
OD_OVR_VA: 20/
OD_VPRISM_DIRECTION: SV
OD_VPRISM_DIRECTION: SV
OS_CYLINDER: -0.50
OS_SPHERE: -1.50
OS_VPRISM_DIRECTION: SV
OS_CYLINDER: -0.50
OS_SPHERE: -1.75
OD_OVR_VA: 20/
OS_AXIS: 094
OD_AXIS: 085

## 2023-04-10 ASSESSMENT — REFRACTION_AUTOREFRACTION
OS_CYLINDER: -0.25
OS_SPHERE: -1.00
OS_AXIS: 166
OD_AXIS: 068
OD_CYLINDER: -0.75
OD_SPHERE: -0.25

## 2023-04-10 ASSESSMENT — TONOMETRY
OS_IOP_MMHG: 15
OD_IOP_MMHG: 16

## 2023-04-10 ASSESSMENT — CONFRONTATIONAL VISUAL FIELD TEST (CVF)
OD_FINDINGS: FULL
OS_FINDINGS: FULL

## 2023-04-10 ASSESSMENT — KERATOMETRY
METHOD_AUTO_MANUAL: AUTO
OD_K1POWER_DIOPTERS: 44.00
OD_K2POWER_DIOPTERS: 45.00
OD_AXISANGLE_DEGREES: 111
OS_AXISANGLE_DEGREES: 006
OS_K1POWER_DIOPTERS: 43.75
OS_K2POWER_DIOPTERS: 44.25

## 2023-04-10 ASSESSMENT — REFRACTION_MANIFEST
OS_AXIS: 100
OS_SPHERE: -1.25
OD_AXIS: 090
OD_CYLINDER: -0.50
OS_SPHERE: -1.50
OD_VA1: 20/20
OS_VA1: 20/20-1
OD_CYLINDER: -0.50
OS_SPHERE: -0.75
OD_AXIS: 080
OD_CYLINDER: -0.50
OS_CYLINDER: -1.00
OD_VA1: 20/20
OD_SPHERE: -0.75
OS_CYLINDER: -0.50
OD_SPHERE: -1.25
OS_SPHERE: -1.50
OD_SPHERE: -1.50
OS_AXIS: 085
OS_CYLINDER: -0.50
OS_CYLINDER: -0.50
OS_VA1: 20/20
OD_CYLINDER: -0.50
OU_VA: 20/20-2
OU_VA: 20/20
OD_AXIS: 080
OD_AXIS: 080
OS_AXIS: 099
OS_AXIS: 085
OD_SPHERE: -1.25

## 2023-04-10 ASSESSMENT — VISUAL ACUITY
OD_BCVA: 20/20-1
OS_BCVA: 20/20-1

## 2023-04-28 ENCOUNTER — APPOINTMENT (OUTPATIENT)
Dept: PULMONOLOGY | Facility: CLINIC | Age: 68
End: 2023-04-28

## 2023-05-01 ENCOUNTER — APPOINTMENT (OUTPATIENT)
Dept: PULMONOLOGY | Facility: CLINIC | Age: 68
End: 2023-05-01
Payer: MEDICARE

## 2023-05-01 DIAGNOSIS — R05.9 COUGH, UNSPECIFIED: ICD-10-CM

## 2023-05-01 DIAGNOSIS — M54.16 RADICULOPATHY, LUMBAR REGION: ICD-10-CM

## 2023-05-01 PROCEDURE — 99214 OFFICE O/P EST MOD 30 MIN: CPT | Mod: 95

## 2023-05-01 RX ORDER — DICLOFENAC SODIUM 75 MG/1
75 TABLET, DELAYED RELEASE ORAL TWICE DAILY
Qty: 60 | Refills: 2 | Status: ACTIVE | COMMUNITY
Start: 2023-05-01 | End: 1900-01-01

## 2023-05-01 NOTE — DISCUSSION/SUMMARY
[FreeTextEntry1] : Dorothy is a patient with lower back pain shooting down posterior left leg, likely secondary to lumbar radiculopathy.  Secondly, she is a patient with mild cough, could be secondary to seasonal/environmental allergies.

## 2023-05-01 NOTE — ASSESSMENT
[FreeTextEntry1] : Start prednisone taper.\par Start diclofenac 75 mg p.o. twice daily as needed for lower back pain.\par Advised her on well hydration.\par \par \par \par Time spent in telehealth consultation, charting and care coordination is 30 minutes.\par \par

## 2023-05-01 NOTE — HISTORY OF PRESENT ILLNESS
[TextBox_4] : Omari complains of lower back pain radiating down posterior left leg for 1 week, also has mild cough, no other symptoms.\par \par \par \par This visit was provided via telehealth using real-time 2-way audio visual technology.  The patient, OMARI KHANNA, was located at home, [address] [place] [address]\[place] [address] at the time of the visit.  \par The provider, Emi Weber, was located at the office [address] [place] [address], [address], [place], [place] at the time of the visit. \par The patient, Ms. OMARI KHANNA  and physician Emi Weber DO, participated in the telehealth encounter.\par \par Verbal consent was obtained by the  from patient.\par

## 2023-06-01 NOTE — PLAN
show
[FreeTextEntry1] : On Folia injection by Endocrine\par Advised patient to take vitamin D and calcium on a regular basis\par Venipuncture with labs drawn in office\par

## 2023-06-20 ENCOUNTER — NON-APPOINTMENT (OUTPATIENT)
Age: 68
End: 2023-06-20

## 2023-06-20 ENCOUNTER — APPOINTMENT (OUTPATIENT)
Dept: PULMONOLOGY | Facility: CLINIC | Age: 68
End: 2023-06-20
Payer: MEDICARE

## 2023-06-20 VITALS — SYSTOLIC BLOOD PRESSURE: 105 MMHG | OXYGEN SATURATION: 97 % | DIASTOLIC BLOOD PRESSURE: 65 MMHG | HEART RATE: 73 BPM

## 2023-06-20 DIAGNOSIS — C55 MALIGNANT NEOPLASM OF UTERUS, PART UNSPECIFIED: ICD-10-CM

## 2023-06-20 PROCEDURE — 99213 OFFICE O/P EST LOW 20 MIN: CPT

## 2023-06-21 PROBLEM — C55 ENDOMETRIOID ADENOCARCINOMA OF UTERUS: Status: ACTIVE | Noted: 2017-10-27

## 2023-06-21 NOTE — HISTORY OF PRESENT ILLNESS
[FreeTextEntry1] : Follow up [de-identified] : OMARI KHANNA is a 68 year old female, with history of benign hypertension, hypothyroidism, osteopenia, who presents to the office for follow up evaluation. Patient reports that she continues to have back pain since her last visit. She states of receiving a recent echocardiogram which indicated mild insignificant fluid but was unremarkable overall. Patient reports that she will be receiving an MRI tomorrow for kidney evaluation. She states of having an episode of dizziness.

## 2023-06-21 NOTE — ADDENDUM
[FreeTextEntry1] : I, Grace Nealdamion, acted solely as a scribe for Dr. Emi Weber D.O., on this date 06/20/2023. \par \par All medical record entries made by the Scribe were at my, Dr. Emi Weber D.O., direction and personally dictated by me on 06/20/2023. I have reviewed the chart and agree that the record accurately reflects my personal performance of the history, physical exam, assessment and plan. I have also personally directed, reviewed, and agreed with the chart.

## 2023-06-21 NOTE — PLAN
[FreeTextEntry1] : Continue diclofenac 75 mg p.o. twice daily as needed for lower back pain/sciatica.\par Medications reviewed. Continue present medications.\par Return for follow up in 3 months.

## 2023-06-21 NOTE — ASSESSMENT
[FreeTextEntry1] : Ms. OMARI KHANNA is an 68 year old female, history of benign hypertension, hypothyroidism, osteopenia. Patient continues to have back pain likely secondary to sciatica.

## 2023-09-26 ENCOUNTER — APPOINTMENT (OUTPATIENT)
Dept: PULMONOLOGY | Facility: CLINIC | Age: 68
End: 2023-09-26
Payer: MEDICARE

## 2023-09-26 VITALS — DIASTOLIC BLOOD PRESSURE: 75 MMHG | OXYGEN SATURATION: 95 % | HEART RATE: 81 BPM | SYSTOLIC BLOOD PRESSURE: 138 MMHG

## 2023-09-26 DIAGNOSIS — I10 ESSENTIAL (PRIMARY) HYPERTENSION: ICD-10-CM

## 2023-09-26 PROCEDURE — 36415 COLL VENOUS BLD VENIPUNCTURE: CPT

## 2023-09-26 PROCEDURE — 99213 OFFICE O/P EST LOW 20 MIN: CPT | Mod: 25

## 2023-09-28 PROBLEM — I10 BENIGN HYPERTENSION WITHOUT CONGESTIVE HEART FAILURE: Status: ACTIVE | Noted: 2018-07-20

## 2023-10-01 LAB
CHOLEST SERPL-MCNC: 209 MG/DL
HDLC SERPL-MCNC: 55 MG/DL
LDLC SERPL CALC-MCNC: 123 MG/DL
NONHDLC SERPL-MCNC: 154 MG/DL
T4 FREE SERPL-MCNC: 1.1 NG/DL
TRIGL SERPL-MCNC: 172 MG/DL
TSH SERPL-ACNC: 2.41 UIU/ML

## 2023-11-07 ENCOUNTER — APPOINTMENT (OUTPATIENT)
Dept: SURGERY | Facility: CLINIC | Age: 68
End: 2023-11-07
Payer: MEDICARE

## 2023-11-07 VITALS — DIASTOLIC BLOOD PRESSURE: 74 MMHG | HEIGHT: 65 IN | HEART RATE: 81 BPM | SYSTOLIC BLOOD PRESSURE: 138 MMHG

## 2023-11-07 PROCEDURE — 99203 OFFICE O/P NEW LOW 30 MIN: CPT

## 2023-11-15 ENCOUNTER — RESULT REVIEW (OUTPATIENT)
Age: 68
End: 2023-11-15

## 2023-11-27 ENCOUNTER — OUTPATIENT (OUTPATIENT)
Dept: OUTPATIENT SERVICES | Facility: HOSPITAL | Age: 68
LOS: 1 days | End: 2023-11-27
Payer: MEDICARE

## 2023-11-27 VITALS
SYSTOLIC BLOOD PRESSURE: 126 MMHG | HEART RATE: 81 BPM | OXYGEN SATURATION: 97 % | HEIGHT: 64 IN | WEIGHT: 171.96 LBS | RESPIRATION RATE: 15 BRPM | TEMPERATURE: 99 F | DIASTOLIC BLOOD PRESSURE: 82 MMHG

## 2023-11-27 DIAGNOSIS — E03.9 HYPOTHYROIDISM, UNSPECIFIED: ICD-10-CM

## 2023-11-27 DIAGNOSIS — Z91.89 OTHER SPECIFIED PERSONAL RISK FACTORS, NOT ELSEWHERE CLASSIFIED: ICD-10-CM

## 2023-11-27 DIAGNOSIS — C43.72 MALIGNANT MELANOMA OF LEFT LOWER LIMB, INCLUDING HIP: ICD-10-CM

## 2023-11-27 DIAGNOSIS — C43.9 MALIGNANT MELANOMA OF SKIN, UNSPECIFIED: ICD-10-CM

## 2023-11-27 DIAGNOSIS — Z98.890 OTHER SPECIFIED POSTPROCEDURAL STATES: Chronic | ICD-10-CM

## 2023-11-27 LAB
ALBUMIN SERPL ELPH-MCNC: 4.2 G/DL — SIGNIFICANT CHANGE UP (ref 3.3–5)
ALBUMIN SERPL ELPH-MCNC: 4.2 G/DL — SIGNIFICANT CHANGE UP (ref 3.3–5)
ALP SERPL-CCNC: 48 U/L — SIGNIFICANT CHANGE UP (ref 40–120)
ALP SERPL-CCNC: 48 U/L — SIGNIFICANT CHANGE UP (ref 40–120)
ALT FLD-CCNC: 12 U/L — SIGNIFICANT CHANGE UP (ref 4–33)
ALT FLD-CCNC: 12 U/L — SIGNIFICANT CHANGE UP (ref 4–33)
ANION GAP SERPL CALC-SCNC: 11 MMOL/L — SIGNIFICANT CHANGE UP (ref 7–14)
ANION GAP SERPL CALC-SCNC: 11 MMOL/L — SIGNIFICANT CHANGE UP (ref 7–14)
AST SERPL-CCNC: 17 U/L — SIGNIFICANT CHANGE UP (ref 4–32)
AST SERPL-CCNC: 17 U/L — SIGNIFICANT CHANGE UP (ref 4–32)
BILIRUB SERPL-MCNC: 0.2 MG/DL — SIGNIFICANT CHANGE UP (ref 0.2–1.2)
BILIRUB SERPL-MCNC: 0.2 MG/DL — SIGNIFICANT CHANGE UP (ref 0.2–1.2)
BUN SERPL-MCNC: 21 MG/DL — SIGNIFICANT CHANGE UP (ref 7–23)
BUN SERPL-MCNC: 21 MG/DL — SIGNIFICANT CHANGE UP (ref 7–23)
CALCIUM SERPL-MCNC: 10.4 MG/DL — SIGNIFICANT CHANGE UP (ref 8.4–10.5)
CALCIUM SERPL-MCNC: 10.4 MG/DL — SIGNIFICANT CHANGE UP (ref 8.4–10.5)
CHLORIDE SERPL-SCNC: 107 MMOL/L — SIGNIFICANT CHANGE UP (ref 98–107)
CHLORIDE SERPL-SCNC: 107 MMOL/L — SIGNIFICANT CHANGE UP (ref 98–107)
CO2 SERPL-SCNC: 27 MMOL/L — SIGNIFICANT CHANGE UP (ref 22–31)
CO2 SERPL-SCNC: 27 MMOL/L — SIGNIFICANT CHANGE UP (ref 22–31)
CREAT SERPL-MCNC: 0.88 MG/DL — SIGNIFICANT CHANGE UP (ref 0.5–1.3)
CREAT SERPL-MCNC: 0.88 MG/DL — SIGNIFICANT CHANGE UP (ref 0.5–1.3)
EGFR: 71 ML/MIN/1.73M2 — SIGNIFICANT CHANGE UP
EGFR: 71 ML/MIN/1.73M2 — SIGNIFICANT CHANGE UP
GLUCOSE SERPL-MCNC: 94 MG/DL — SIGNIFICANT CHANGE UP (ref 70–99)
GLUCOSE SERPL-MCNC: 94 MG/DL — SIGNIFICANT CHANGE UP (ref 70–99)
HCT VFR BLD CALC: 39 % — SIGNIFICANT CHANGE UP (ref 34.5–45)
HCT VFR BLD CALC: 39 % — SIGNIFICANT CHANGE UP (ref 34.5–45)
HGB BLD-MCNC: 12.5 G/DL — SIGNIFICANT CHANGE UP (ref 11.5–15.5)
HGB BLD-MCNC: 12.5 G/DL — SIGNIFICANT CHANGE UP (ref 11.5–15.5)
MCHC RBC-ENTMCNC: 29.3 PG — SIGNIFICANT CHANGE UP (ref 27–34)
MCHC RBC-ENTMCNC: 29.3 PG — SIGNIFICANT CHANGE UP (ref 27–34)
MCHC RBC-ENTMCNC: 32.1 GM/DL — SIGNIFICANT CHANGE UP (ref 32–36)
MCHC RBC-ENTMCNC: 32.1 GM/DL — SIGNIFICANT CHANGE UP (ref 32–36)
MCV RBC AUTO: 91.3 FL — SIGNIFICANT CHANGE UP (ref 80–100)
MCV RBC AUTO: 91.3 FL — SIGNIFICANT CHANGE UP (ref 80–100)
NRBC # BLD: 0 /100 WBCS — SIGNIFICANT CHANGE UP (ref 0–0)
NRBC # BLD: 0 /100 WBCS — SIGNIFICANT CHANGE UP (ref 0–0)
NRBC # FLD: 0 K/UL — SIGNIFICANT CHANGE UP (ref 0–0)
NRBC # FLD: 0 K/UL — SIGNIFICANT CHANGE UP (ref 0–0)
PLATELET # BLD AUTO: 254 K/UL — SIGNIFICANT CHANGE UP (ref 150–400)
PLATELET # BLD AUTO: 254 K/UL — SIGNIFICANT CHANGE UP (ref 150–400)
POTASSIUM SERPL-MCNC: 4.6 MMOL/L — SIGNIFICANT CHANGE UP (ref 3.5–5.3)
POTASSIUM SERPL-MCNC: 4.6 MMOL/L — SIGNIFICANT CHANGE UP (ref 3.5–5.3)
POTASSIUM SERPL-SCNC: 4.6 MMOL/L — SIGNIFICANT CHANGE UP (ref 3.5–5.3)
POTASSIUM SERPL-SCNC: 4.6 MMOL/L — SIGNIFICANT CHANGE UP (ref 3.5–5.3)
PROT SERPL-MCNC: 7.3 G/DL — SIGNIFICANT CHANGE UP (ref 6–8.3)
PROT SERPL-MCNC: 7.3 G/DL — SIGNIFICANT CHANGE UP (ref 6–8.3)
RBC # BLD: 4.27 M/UL — SIGNIFICANT CHANGE UP (ref 3.8–5.2)
RBC # BLD: 4.27 M/UL — SIGNIFICANT CHANGE UP (ref 3.8–5.2)
RBC # FLD: 13.4 % — SIGNIFICANT CHANGE UP (ref 10.3–14.5)
RBC # FLD: 13.4 % — SIGNIFICANT CHANGE UP (ref 10.3–14.5)
SODIUM SERPL-SCNC: 145 MMOL/L — SIGNIFICANT CHANGE UP (ref 135–145)
SODIUM SERPL-SCNC: 145 MMOL/L — SIGNIFICANT CHANGE UP (ref 135–145)
WBC # BLD: 7.83 K/UL — SIGNIFICANT CHANGE UP (ref 3.8–10.5)
WBC # BLD: 7.83 K/UL — SIGNIFICANT CHANGE UP (ref 3.8–10.5)
WBC # FLD AUTO: 7.83 K/UL — SIGNIFICANT CHANGE UP (ref 3.8–10.5)
WBC # FLD AUTO: 7.83 K/UL — SIGNIFICANT CHANGE UP (ref 3.8–10.5)

## 2023-11-27 PROCEDURE — 93010 ELECTROCARDIOGRAM REPORT: CPT

## 2023-11-27 RX ORDER — LEVOTHYROXINE SODIUM 125 MCG
1 TABLET ORAL
Refills: 0 | DISCHARGE

## 2023-11-27 RX ORDER — SODIUM CHLORIDE 9 MG/ML
1000 INJECTION, SOLUTION INTRAVENOUS
Refills: 0 | Status: DISCONTINUED | OUTPATIENT
Start: 2023-12-08 | End: 2023-12-22

## 2023-11-27 NOTE — H&P PST ADULT - ENDOCRINE COMMENTS
hypothyroidism - on levothyroxine- patient also reports of history of hyperthyroidism - 15 years ago - didn't need any treatment

## 2023-11-27 NOTE — H&P PST ADULT - NSICDXPASTMEDICALHX_GEN_ALL_CORE_FT
PAST MEDICAL HISTORY:  Basal cell carcinoma     Endometrial cancer     H/O melanoma excision     History of hyperthyroidism     History of pelvic surgery     Hypothyroidism     Ovarian cancer     S/P radiation therapy

## 2023-11-27 NOTE — H&P PST ADULT - FUNCTIONAL STATUS
walks with walker, cooking, wash dishes, cannot climb stairs due to left leg disability ( pain/discomfort) METs 4/4-10 METS

## 2023-11-27 NOTE — H&P PST ADULT - NSANTHOSAYNRD_GEN_A_CORE
No. ANMOL screening performed.  STOP BANG Legend: 0-2 = LOW Risk; 3-4 = INTERMEDIATE Risk; 5-8 = HIGH Risk

## 2023-11-27 NOTE — H&P PST ADULT - HISTORY OF PRESENT ILLNESS
68 year old female, presents to PST, with pre op diagnosis of malignant melanoma of left lower limb, for pre op evaluation prior to scheduled surgery- excision left leg skin cancer with Dr Hong.

## 2023-11-27 NOTE — H&P PST ADULT - OTHER CARE PROVIDERS
Dr Benton- [phone]- cardiologist Dr Benton- [phone]- cardiologist Dr Benton- [phone]- cardiologist

## 2023-11-27 NOTE — H&P PST ADULT - NSICDXPASTSURGICALHX_GEN_ALL_CORE_FT
PAST SURGICAL HISTORY:  History of Tonsillectomy     S/P CRISTINO-BSO (Total Abdominal Hysterectomy and Bilateral Salpingo-Oophorectomy)      PAST SURGICAL HISTORY:  History of Tonsillectomy     S/P breast biopsy     S/P CRISTINO-BSO (Total Abdominal Hysterectomy and Bilateral Salpingo-Oophorectomy)

## 2023-11-27 NOTE — H&P PST ADULT - PROBLEM SELECTOR PLAN 1
Patient is tentatively scheduled surgery- excision left leg skin cancer with Dr Hong.  12/08/23.    Pre-op instructions provided. Pt given verbal and written instructions with teach back on chlorhexidine wash and pepcid. Pt verbalized understanding with return demonstration.    CBC, CMP sent Patient is tentatively scheduled surgery- excision left leg skin cancer with Dr Hong.  12/08/23.    Pre-op instructions provided. Pt given verbal and written instructions with teach back on chlorhexidine wash and pepcid. Pt verbalized understanding with return demonstration.    CBC, CMP sent  Awaiting ECHO results from  cardiologist  EKG in chart

## 2023-11-27 NOTE — H&P PST ADULT - SKIN COMMENTS
pre op diagnosis- malignant neoplasm melanoma of left LE- melanoma small wound open to the air pre op diagnosis- malignant neoplasm melanoma of left LE- small patches of melanoma open to air

## 2023-11-30 ENCOUNTER — RX RENEWAL (OUTPATIENT)
Age: 68
End: 2023-11-30

## 2023-11-30 RX ORDER — LEVOTHYROXINE SODIUM 0.03 MG/1
25 TABLET ORAL DAILY
Qty: 90 | Refills: 3 | Status: ACTIVE | COMMUNITY
Start: 2022-03-25 | End: 1900-01-01

## 2023-12-07 ENCOUNTER — TRANSCRIPTION ENCOUNTER (OUTPATIENT)
Age: 68
End: 2023-12-07

## 2023-12-07 VITALS
HEIGHT: 64 IN | WEIGHT: 171.96 LBS | TEMPERATURE: 98 F | SYSTOLIC BLOOD PRESSURE: 161 MMHG | RESPIRATION RATE: 18 BRPM | HEART RATE: 67 BPM | DIASTOLIC BLOOD PRESSURE: 80 MMHG | OXYGEN SATURATION: 99 %

## 2023-12-07 NOTE — ASU PREOPERATIVE ASSESSMENT, ADULT (IPARK ONLY) - FALL HARM RISK - UNIVERSAL INTERVENTIONS
Bed in lowest position, wheels locked, appropriate side rails in place/Call bell, personal items and telephone in reach/Instruct patient to call for assistance before getting out of bed or chair/Non-slip footwear when patient is out of bed/[place] to call system/Physically safe environment - no spills, clutter or unnecessary equipment/Purposeful Proactive Rounding/Room/bathroom lighting operational, light cord in reach Bed in lowest position, wheels locked, appropriate side rails in place/Call bell, personal items and telephone in reach/Instruct patient to call for assistance before getting out of bed or chair/Non-slip footwear when patient is out of bed/[place] to call system/Physically safe environment - no spills, clutter or unnecessary equipment/Purposeful Proactive Rounding/Room/bathroom lighting operational, light cord in reach

## 2023-12-07 NOTE — ASU PREOPERATIVE ASSESSMENT, ADULT ([facility] ONLY) - FALL HARM RISK - RISK INTERVENTIONS
Assistance OOB with selected safe patient handling equipment/Assistance with ambulation/Communicate Fall Risk and Risk Factors to all staff, patient, and family/Discuss with provider need for PT consult/Monitor gait and stability/Provide patient with walking aids - walker, cane, crutches/Reinforce activity limits and safety measures with patient and family/Visual Cue: Yellow wristband/Bed in lowest position, wheels locked, appropriate side rails in place/Call bell, personal items and telephone in reach/Instruct patient to call for assistance before getting out of bed or chair/Non-slip footwear when patient is out of bed/[place] to call system/Physically safe environment - no spills, clutter or unnecessary equipment/Purposeful Proactive Rounding/Room/bathroom lighting operational, light cord in reach Assistance OOB with selected safe patient handling equipment/Assistance with ambulation/Communicate Fall Risk and Risk Factors to all staff, patient, and family/Discuss with provider need for PT consult/Monitor gait and stability/Provide patient with walking aids - walker, cane, crutches/Reinforce activity limits and safety measures with patient and family/Visual Cue: Yellow wristband/Bed in lowest position, wheels locked, appropriate side rails in place/Call bell, personal items and telephone in reach/Instruct patient to call for assistance before getting out of bed or chair/Non-slip footwear when patient is out of bed/[place] to call system/Physically safe environment - no spills, clutter or unnecessary equipment/Purposeful Proactive Rounding/Room/bathroom lighting operational, light cord in reach

## 2023-12-08 ENCOUNTER — OUTPATIENT (OUTPATIENT)
Dept: OUTPATIENT SERVICES | Facility: HOSPITAL | Age: 68
LOS: 1 days | Discharge: HOME | End: 2023-12-08
Payer: MEDICARE

## 2023-12-08 ENCOUNTER — TRANSCRIPTION ENCOUNTER (OUTPATIENT)
Age: 68
End: 2023-12-08

## 2023-12-08 ENCOUNTER — RESULT REVIEW (OUTPATIENT)
Age: 68
End: 2023-12-08

## 2023-12-08 ENCOUNTER — APPOINTMENT (OUTPATIENT)
Dept: SURGERY | Facility: HOSPITAL | Age: 68
End: 2023-12-08
Payer: MEDICARE

## 2023-12-08 VITALS
RESPIRATION RATE: 20 BRPM | HEART RATE: 65 BPM | OXYGEN SATURATION: 99 % | SYSTOLIC BLOOD PRESSURE: 130 MMHG | DIASTOLIC BLOOD PRESSURE: 50 MMHG | TEMPERATURE: 98 F

## 2023-12-08 DIAGNOSIS — C43.72 MALIGNANT MELANOMA OF LEFT LOWER LIMB, INCLUDING HIP: ICD-10-CM

## 2023-12-08 DIAGNOSIS — Z98.890 OTHER SPECIFIED POSTPROCEDURAL STATES: Chronic | ICD-10-CM

## 2023-12-08 PROCEDURE — 88305 TISSUE EXAM BY PATHOLOGIST: CPT | Mod: 26

## 2023-12-08 PROCEDURE — 11604 EXC TR-EXT MAL+MARG 3.1-4 CM: CPT

## 2023-12-08 RX ORDER — ACETAMINOPHEN 500 MG
2 TABLET ORAL
Qty: 0 | Refills: 0 | DISCHARGE
Start: 2023-12-08

## 2023-12-08 RX ORDER — ACETAMINOPHEN 500 MG
1000 TABLET ORAL EVERY 6 HOURS
Refills: 0 | Status: DISCONTINUED | OUTPATIENT
Start: 2023-12-08 | End: 2023-12-22

## 2023-12-08 NOTE — ASU DISCHARGE PLAN (ADULT/PEDIATRIC) - NS MD DC FALL RISK RISK
For information on Fall & Injury Prevention, visit: [email or web address]/[email or web address]-[email or web address]-mobility OR  [email or web address]://www.[facility]/[email or web address]-prevention-tips-[email or web address]-injury OR  https://www.cdc.gov/steadi/patient.html For information on Fall & Injury Prevention, visit: [email or web address]://www.[facility]/[email or web address]-[email or web address]-[email or web address]-health-[email or web address]-mobility OR  https://www.[facility]/news/fall-prevention-tips-to-avoid-injury OR  https://www.cdc.gov/steadi/patient.html

## 2023-12-08 NOTE — ASU DISCHARGE PLAN (ADULT/PEDIATRIC) - FOLLOW UP APPOINTMENTS
[facility] Medicine ([facility]) [facility][1] Medicine ([facility])

## 2023-12-08 NOTE — ASU DISCHARGE PLAN (ADULT/PEDIATRIC) - CARE PROVIDER_API CALL
Cyril Hong  Surgery  [address] [place] [address] [address]  [place] [address]  Phone: [phone]  Fax: [phone]  Follow Up Time:    Cyril Hong  Surgery  [address] [place] [address] [address]  [place] [address]  Phone: [phone]  Fax: [phone]  Follow Up Time:

## 2023-12-08 NOTE — ASU DISCHARGE PLAN (ADULT/PEDIATRIC) - ASU DC SPECIAL INSTRUCTIONSFT
keep leg elevated. come to dr chavira's office for previously scheduled postoperative visit in 2 weeks

## 2023-12-08 NOTE — ASU DISCHARGE PLAN (ADULT/PEDIATRIC) - PHYSICIAN SECTION COMPLETE
Chief Complaint   Patient presents with    Well Child     14 year     PHQ over the last two weeks 8/9/2017   Little interest or pleasure in doing things Not at all   Feeling down, depressed or hopeless Not at all   Total Score PHQ 2 0 Yes

## 2023-12-08 NOTE — ASU PREOP CHECKLIST - SURGICAL CONSENT
Name: Griselda Chaudhry  YOB: 1944  Gender: female  MRN: [id number]      CC: Shoulder Pain (L)       HPI: Griselda Chaudhry is a 77 y.o. female who returns for follow up on her L shoulder. She reports that she got 2 to 3 months of relief from the previous injection however her pain has returned and she would like to consider repeat injection at this time. Physical Examination:  General: no acute distress  Lungs: breathing easily  CV: regular rhythm by pulse  Left shoulder: Tenderness to palpation anterior shoulder around the bicipital groove. Active forward flexion to 150 degrees with pain in the extreme. External rotation with elbows at side equal bilaterally. Active external rotation to the back of the  head, internal rotation to the hip. External rotation with elbows at side resisted range of motion 5/5 strength. Pain with empty can test but 5/5 strength empty can position. Positive [employer], Neer's. Pain  with Yancey's and speeds:        Assessment:   1. Left shoulder pain, unspecified chronicity    2. Arthritis of left glenohumeral joint         Plan:   Patient who returns with left shoulder pain with known osteoarthritis. I discussed with the patient that the only surgical intervention that we would recommend would be a total shoulder replacement. She is not ready to proceed with any type of surgical intervention at this time and would like to proceed with a repeat corticosteroid injection. I instructed her that if she does undergo injection today that may delay her ability to have a total shoulder replacement based on increased risk for infection and she wishes to continue to proceed. If she wishes to be referred to [address] for surgical consultation she will let us know. After informed verbal consent was obtained the left shoulder glenohumeral joint was injected under direct anterior approach with 6 cc of 0.25% Marcaine and 1 cc of 40 mg Kenalog.   The patient tolerated
done

## 2023-12-11 ENCOUNTER — RESULT REVIEW (OUTPATIENT)
Age: 68
End: 2023-12-11

## 2023-12-11 ENCOUNTER — APPOINTMENT (OUTPATIENT)
Dept: MAMMOGRAPHY | Facility: IMAGING CENTER | Age: 68
End: 2023-12-11
Payer: MEDICARE

## 2023-12-11 ENCOUNTER — APPOINTMENT (OUTPATIENT)
Dept: ULTRASOUND IMAGING | Facility: IMAGING CENTER | Age: 68
End: 2023-12-11
Payer: MEDICARE

## 2023-12-11 ENCOUNTER — OUTPATIENT (OUTPATIENT)
Dept: OUTPATIENT SERVICES | Facility: HOSPITAL | Age: 68
LOS: 1 days | End: 2023-12-11
Payer: MEDICARE

## 2023-12-11 DIAGNOSIS — N63.0 UNSPECIFIED LUMP IN UNSPECIFIED BREAST: ICD-10-CM

## 2023-12-11 DIAGNOSIS — Z00.8 ENCOUNTER FOR OTHER GENERAL EXAMINATION: ICD-10-CM

## 2023-12-11 DIAGNOSIS — Z98.890 OTHER SPECIFIED POSTPROCEDURAL STATES: Chronic | ICD-10-CM

## 2023-12-11 PROBLEM — C54.1 MALIGNANT NEOPLASM OF ENDOMETRIUM: Chronic | Status: ACTIVE | Noted: 2023-11-27

## 2023-12-11 PROBLEM — E03.9 HYPOTHYROIDISM, UNSPECIFIED: Chronic | Status: ACTIVE | Noted: 2023-11-27

## 2023-12-11 PROBLEM — C44.91 BASAL CELL CARCINOMA OF SKIN, UNSPECIFIED: Chronic | Status: ACTIVE | Noted: 2023-11-27

## 2023-12-11 PROBLEM — Z86.39 PERSONAL HISTORY OF OTHER ENDOCRINE, NUTRITIONAL AND METABOLIC DISEASE: Chronic | Status: ACTIVE | Noted: 2023-11-27

## 2023-12-11 PROBLEM — Z92.3 PERSONAL HISTORY OF IRRADIATION: Chronic | Status: ACTIVE | Noted: 2023-11-27

## 2023-12-11 PROBLEM — C56.9 MALIGNANT NEOPLASM OF UNSPECIFIED OVARY: Chronic | Status: ACTIVE | Noted: 2023-11-27

## 2023-12-11 PROCEDURE — 76641 ULTRASOUND BREAST COMPLETE: CPT | Mod: 26,50,GY

## 2023-12-11 PROCEDURE — 76641 ULTRASOUND BREAST COMPLETE: CPT

## 2023-12-11 PROCEDURE — 77067 SCR MAMMO BI INCL CAD: CPT | Mod: 26

## 2023-12-11 PROCEDURE — 77063 BREAST TOMOSYNTHESIS BI: CPT | Mod: 26

## 2023-12-11 PROCEDURE — 77063 BREAST TOMOSYNTHESIS BI: CPT

## 2023-12-11 PROCEDURE — 77067 SCR MAMMO BI INCL CAD: CPT

## 2023-12-18 LAB
SURGICAL PATHOLOGY STUDY: SIGNIFICANT CHANGE UP
SURGICAL PATHOLOGY STUDY: SIGNIFICANT CHANGE UP

## 2023-12-19 ENCOUNTER — APPOINTMENT (OUTPATIENT)
Dept: SURGERY | Facility: CLINIC | Age: 68
End: 2023-12-19
Payer: MEDICARE

## 2023-12-19 DIAGNOSIS — C44.90 UNSPECIFIED MALIGNANT NEOPLASM OF SKIN, UNSPECIFIED: ICD-10-CM

## 2023-12-19 PROCEDURE — 99024 POSTOP FOLLOW-UP VISIT: CPT

## 2023-12-19 NOTE — PHYSICAL EXAM
[Normal] : orientation to person, place, and time: normal [de-identified] : left leg well healed scar

## 2024-01-03 ENCOUNTER — APPOINTMENT (OUTPATIENT)
Dept: PULMONOLOGY | Facility: CLINIC | Age: 69
End: 2024-01-03
Payer: MEDICARE

## 2024-01-03 VITALS
HEART RATE: 79 BPM | RESPIRATION RATE: 16 BRPM | DIASTOLIC BLOOD PRESSURE: 76 MMHG | OXYGEN SATURATION: 95 % | SYSTOLIC BLOOD PRESSURE: 130 MMHG

## 2024-01-03 PROCEDURE — 99213 OFFICE O/P EST LOW 20 MIN: CPT

## 2024-01-03 NOTE — PHYSICAL EXAM
[No Acute Distress] : no acute distress [Well Nourished] : well nourished [Well Developed] : well developed [Well-Appearing] : well-appearing [Normal Sclera/Conjunctiva] : normal sclera/conjunctiva [PERRL] : pupils equal round and reactive to light [EOMI] : extraocular movements intact [Normal Outer Ear/Nose] : the outer ears and nose were normal in appearance [Normal Oropharynx] : the oropharynx was normal [No JVD] : no jugular venous distention [No Lymphadenopathy] : no lymphadenopathy [Supple] : supple [Thyroid Normal, No Nodules] : the thyroid was normal and there were no nodules present [No Respiratory Distress] : no respiratory distress  [No Accessory Muscle Use] : no accessory muscle use [Clear to Auscultation] : lungs were clear to auscultation bilaterally [Normal Rate] : normal rate  [Regular Rhythm] : with a regular rhythm [Normal S1, S2] : normal S1 and S2 [No Murmur] : no murmur heard [No Carotid Bruits] : no carotid bruits [No Abdominal Bruit] : a ~M bruit was not heard ~T in the abdomen [No Varicosities] : no varicosities [Pedal Pulses Present] : the pedal pulses are present [No Edema] : there was no peripheral edema [No Palpable Aorta] : no palpable aorta [No Extremity Clubbing/Cyanosis] : no extremity clubbing/cyanosis [Soft] : abdomen soft [Non Tender] : non-tender [Non-distended] : non-distended [No Masses] : no abdominal mass palpated [No HSM] : no HSM [Normal Bowel Sounds] : normal bowel sounds [Normal Posterior Cervical Nodes] : no posterior cervical lymphadenopathy [Normal Anterior Cervical Nodes] : no anterior cervical lymphadenopathy [No CVA Tenderness] : no CVA  tenderness [No Spinal Tenderness] : no spinal tenderness [No Joint Swelling] : no joint swelling [Grossly Normal Strength/Tone] : grossly normal strength/tone [No Rash] : no rash [Coordination Grossly Intact] : coordination grossly intact [No Focal Deficits] : no focal deficits [Normal Gait] : normal gait [Deep Tendon Reflexes (DTR)] : deep tendon reflexes were 2+ and symmetric [Normal Affect] : the affect was normal [Normal Insight/Judgement] : insight and judgment were intact Thalidomide Counseling: I discussed with the patient the risks of thalidomide including but not limited to birth defects, anxiety, weakness, chest pain, dizziness, cough and severe allergy.

## 2024-01-04 LAB
ALBUMIN SERPL ELPH-MCNC: 4.7 G/DL
ALP BLD-CCNC: 55 U/L
ALT SERPL-CCNC: 12 U/L
ANION GAP SERPL CALC-SCNC: 11 MMOL/L
AST SERPL-CCNC: 20 U/L
BASOPHILS # BLD AUTO: 0.05 K/UL
BASOPHILS NFR BLD AUTO: 0.7 %
BILIRUB SERPL-MCNC: 0.2 MG/DL
BUN SERPL-MCNC: 20 MG/DL
CALCIUM SERPL-MCNC: 10.6 MG/DL
CHLORIDE SERPL-SCNC: 102 MMOL/L
CO2 SERPL-SCNC: 28 MMOL/L
CREAT SERPL-MCNC: 0.97 MG/DL
EGFR: 63 ML/MIN/1.73M2
EOSINOPHIL # BLD AUTO: 0.18 K/UL
EOSINOPHIL NFR BLD AUTO: 2.4 %
GLUCOSE SERPL-MCNC: 88 MG/DL
HCT VFR BLD CALC: 39.1 %
HGB BLD-MCNC: 12.9 G/DL
IMM GRANULOCYTES NFR BLD AUTO: 0.3 %
LYMPHOCYTES # BLD AUTO: 1.67 K/UL
LYMPHOCYTES NFR BLD AUTO: 22.6 %
MAN DIFF?: NORMAL
MCHC RBC-ENTMCNC: 30 PG
MCHC RBC-ENTMCNC: 33 GM/DL
MCV RBC AUTO: 90.9 FL
MONOCYTES # BLD AUTO: 0.52 K/UL
MONOCYTES NFR BLD AUTO: 7 %
NEUTROPHILS # BLD AUTO: 4.95 K/UL
NEUTROPHILS NFR BLD AUTO: 67 %
PLATELET # BLD AUTO: 259 K/UL
POTASSIUM SERPL-SCNC: 5.3 MMOL/L
PROT SERPL-MCNC: 7.2 G/DL
RBC # BLD: 4.3 M/UL
RBC # FLD: 13.8 %
SODIUM SERPL-SCNC: 141 MMOL/L
T4 FREE SERPL-MCNC: 1.1 NG/DL
TSH SERPL-ACNC: 3.26 UIU/ML
WBC # FLD AUTO: 7.39 K/UL

## 2024-01-04 NOTE — ADDENDUM
[FreeTextEntry1] : I, Grace Nealdamion, acted solely as a scribe for Dr. Emi Weber D.O., on this date 01/03/2024.   All medical record entries made by the Scribe were at my, Dr. Emi Weber D.O., direction and personally dictated by me on 01/03/2024. I have reviewed the chart and agree that the record accurately reflects my personal performance of the history, physical exam, assessment and plan. I have also personally directed, reviewed, and agreed with the chart.

## 2024-01-04 NOTE — HISTORY OF PRESENT ILLNESS
[FreeTextEntry1] : Follow up. [de-identified] : OMARI KHANNA is a 68 year old female, with history of Hypothyroidism, ostepenia, who presents to the office for follow up evaluation. Patient reports that she is feeling well overall with no complaints.

## 2024-01-04 NOTE — PLAN
[FreeTextEntry1] : Venipuncture with labs drawn in office. Continue levothyroxine for hypothyroidism. Continue Prolia for osteoporosis. Return for an annual wellness visit in 2 months.

## 2024-01-04 NOTE — ASSESSMENT
[FreeTextEntry1] : Ms. OMARI KHANNA is an 68 year old female, history of Hypertension, Hypothyroidism, Osteoporosis.

## 2024-01-23 ENCOUNTER — APPOINTMENT (OUTPATIENT)
Dept: SURGERY | Facility: CLINIC | Age: 69
End: 2024-01-23

## 2024-03-19 ENCOUNTER — LABORATORY RESULT (OUTPATIENT)
Age: 69
End: 2024-03-19

## 2024-03-19 ENCOUNTER — APPOINTMENT (OUTPATIENT)
Dept: PULMONOLOGY | Facility: CLINIC | Age: 69
End: 2024-03-19
Payer: MEDICARE

## 2024-03-19 VITALS — DIASTOLIC BLOOD PRESSURE: 76 MMHG | OXYGEN SATURATION: 97 % | SYSTOLIC BLOOD PRESSURE: 130 MMHG | HEART RATE: 75 BPM

## 2024-03-19 DIAGNOSIS — Z00.00 ENCOUNTER FOR GENERAL ADULT MEDICAL EXAMINATION W/OUT ABNORMAL FINDINGS: ICD-10-CM

## 2024-03-19 DIAGNOSIS — C54.1 MALIGNANT NEOPLASM OF ENDOMETRIUM: ICD-10-CM

## 2024-03-19 LAB — HBA1C MFR BLD HPLC: 5.9

## 2024-03-19 PROCEDURE — 83036 HEMOGLOBIN GLYCOSYLATED A1C: CPT | Mod: QW

## 2024-03-19 PROCEDURE — 99214 OFFICE O/P EST MOD 30 MIN: CPT

## 2024-03-19 PROCEDURE — 36415 COLL VENOUS BLD VENIPUNCTURE: CPT

## 2024-03-19 NOTE — PLAN
[FreeTextEntry1] : Age appropriate preventative care counseling discussed with patient. Venipuncture with labs drawn in office. Obtained and reviewed Hemoglobin A1C results with patient today. Continue levothyroxine for hypothyroidism. Continue Prolia for osteoporosis. Return for follow up in 3 months.

## 2024-03-19 NOTE — HEALTH RISK ASSESSMENT
[Patient reported mammogram was normal] : Patient reported mammogram was normal [Patient reported colonoscopy was abnormal] : Patient reported colonoscopy was abnormal [Patient reported bone density results were normal] : Patient reported bone density results were normal [MammogramDate] : 12/2023 [ColonoscopyDate] : 01/2023 [ColonoscopyComments] : Benign polyps

## 2024-03-19 NOTE — ASSESSMENT
[FreeTextEntry1] : Ms. OMARI KHANNA is an 68 year old female, history of Hypertension, Hypothyroidism, Osteoporosis. Patient appears well overall.

## 2024-03-19 NOTE — DATA REVIEWED
[FreeTextEntry1] :  POCT - Hemoglobin A1C             Final  No Documents Attached    	Test  	Result  	Flag	Reference	Goal	Last Verified 	POCT Hemoglobin A1C	5.9	H	Normal: 4.0 - 5.6		REQUIRED  Ordered by: CHELSEA RANDALL       Collected/Examined: 19Mar2024 02:03PM       Verification Required       Stage: Final       Performed at: In Office       Performed by: CHELSEA RANDALL       Resulted: 19Mar2024 02:03PM       Last Updated: 19Mar2024 02:03PM

## 2024-03-19 NOTE — HISTORY OF PRESENT ILLNESS
[FreeTextEntry1] : Annual wellness visit.  [de-identified] : OMARI KHANNA is a 68 year old female, with history of Hypothyroidism, osteopenia, who presents to the office for follow up evaluation. Patient reports that she is feeling well overall with no complaints. She denies of having any symptoms of bowel movement or urination problems, or abdominal pain. Patient reports that she recently received an EKG which was unremarkable.

## 2024-03-19 NOTE — PHYSICAL EXAM
[No Acute Distress] : no acute distress [Well Nourished] : well nourished [Well-Appearing] : well-appearing [Well Developed] : well developed [PERRL] : pupils equal round and reactive to light [Normal Sclera/Conjunctiva] : normal sclera/conjunctiva [EOMI] : extraocular movements intact [Normal Outer Ear/Nose] : the outer ears and nose were normal in appearance [Normal Oropharynx] : the oropharynx was normal [No Lymphadenopathy] : no lymphadenopathy [No JVD] : no jugular venous distention [Supple] : supple [Thyroid Normal, No Nodules] : the thyroid was normal and there were no nodules present [No Respiratory Distress] : no respiratory distress  [No Accessory Muscle Use] : no accessory muscle use [Normal Rate] : normal rate  [Clear to Auscultation] : lungs were clear to auscultation bilaterally [Normal S1, S2] : normal S1 and S2 [Regular Rhythm] : with a regular rhythm [No Murmur] : no murmur heard [No Carotid Bruits] : no carotid bruits [No Abdominal Bruit] : a ~M bruit was not heard ~T in the abdomen [No Varicosities] : no varicosities [Pedal Pulses Present] : the pedal pulses are present [No Palpable Aorta] : no palpable aorta [No Edema] : there was no peripheral edema [No Extremity Clubbing/Cyanosis] : no extremity clubbing/cyanosis [Soft] : abdomen soft [Non-distended] : non-distended [Non Tender] : non-tender [No Masses] : no abdominal mass palpated [No HSM] : no HSM [Normal Posterior Cervical Nodes] : no posterior cervical lymphadenopathy [Normal Bowel Sounds] : normal bowel sounds [Normal Anterior Cervical Nodes] : no anterior cervical lymphadenopathy [No CVA Tenderness] : no CVA  tenderness [No Spinal Tenderness] : no spinal tenderness [Grossly Normal Strength/Tone] : grossly normal strength/tone [No Joint Swelling] : no joint swelling [Coordination Grossly Intact] : coordination grossly intact [No Rash] : no rash [No Focal Deficits] : no focal deficits [Deep Tendon Reflexes (DTR)] : deep tendon reflexes were 2+ and symmetric [Normal Gait] : normal gait [Normal Affect] : the affect was normal [Normal Insight/Judgement] : insight and judgment were intact

## 2024-03-19 NOTE — ADDENDUM
[FreeTextEntry1] : I, Grace Nealdamion, acted solely as a scribe for Dr. Emi Weber D.O., on this date 03/19/2024.   All medical record entries made by the Scribe were at my, Dr. Emi Weber D.O., direction and personally dictated by me on 03/19/2024. I have reviewed the chart and agree that the record accurately reflects my personal performance of the history, physical exam, assessment and plan. I have also personally directed, reviewed, and agreed with the chart.

## 2024-03-22 LAB
ALBUMIN SERPL ELPH-MCNC: 4.5 G/DL
ALP BLD-CCNC: 55 U/L
ALT SERPL-CCNC: 19 U/L
ANION GAP SERPL CALC-SCNC: 14 MMOL/L
AST SERPL-CCNC: 23 U/L
BASOPHILS # BLD AUTO: 0.07 K/UL
BASOPHILS NFR BLD AUTO: 1 %
BILIRUB SERPL-MCNC: 0.3 MG/DL
BUN SERPL-MCNC: 18 MG/DL
CALCIUM SERPL-MCNC: 10.5 MG/DL
CHLORIDE SERPL-SCNC: 104 MMOL/L
CHOLEST SERPL-MCNC: 206 MG/DL
CO2 SERPL-SCNC: 25 MMOL/L
CREAT SERPL-MCNC: 1.01 MG/DL
EGFR: 60 ML/MIN/1.73M2
EOSINOPHIL # BLD AUTO: 0.15 K/UL
EOSINOPHIL NFR BLD AUTO: 2.2 %
GLUCOSE SERPL-MCNC: 94 MG/DL
HCT VFR BLD CALC: 37.7 %
HCV AB SER QL: NONREACTIVE
HCV S/CO RATIO: 0.06 S/CO
HDLC SERPL-MCNC: 55 MG/DL
HGB BLD-MCNC: 12.5 G/DL
IMM GRANULOCYTES NFR BLD AUTO: 0.1 %
LDLC SERPL CALC-MCNC: 122 MG/DL
LYMPHOCYTES # BLD AUTO: 1.73 K/UL
LYMPHOCYTES NFR BLD AUTO: 25.9 %
MAGNESIUM SERPL-MCNC: 2.3 MG/DL
MAN DIFF?: NORMAL
MCHC RBC-ENTMCNC: 29.9 PG
MCHC RBC-ENTMCNC: 33.2 GM/DL
MCV RBC AUTO: 90.2 FL
MONOCYTES # BLD AUTO: 0.44 K/UL
MONOCYTES NFR BLD AUTO: 6.6 %
NEUTROPHILS # BLD AUTO: 4.29 K/UL
NEUTROPHILS NFR BLD AUTO: 64.2 %
NONHDLC SERPL-MCNC: 150 MG/DL
PHOSPHATE SERPL-MCNC: 4.1 MG/DL
PLATELET # BLD AUTO: 241 K/UL
POTASSIUM SERPL-SCNC: 4.9 MMOL/L
PROT SERPL-MCNC: 7.1 G/DL
RBC # BLD: 4.18 M/UL
RBC # FLD: 14 %
SODIUM SERPL-SCNC: 143 MMOL/L
T3 SERPL-MCNC: 108 NG/DL
T3RU NFR SERPL: 1 TBI
T4 FREE SERPL-MCNC: 1.3 NG/DL
T4 SERPL-MCNC: 8.5 UG/DL
TRIGL SERPL-MCNC: 160 MG/DL
TSH SERPL-ACNC: 2.38 UIU/ML
WBC # FLD AUTO: 6.69 K/UL

## 2024-04-25 ENCOUNTER — APPOINTMENT (OUTPATIENT)
Dept: PULMONOLOGY | Facility: CLINIC | Age: 69
End: 2024-04-25
Payer: MEDICARE

## 2024-04-25 DIAGNOSIS — J06.9 ACUTE UPPER RESPIRATORY INFECTION, UNSPECIFIED: ICD-10-CM

## 2024-04-25 PROCEDURE — 99213 OFFICE O/P EST LOW 20 MIN: CPT

## 2024-04-25 NOTE — DISCUSSION/SUMMARY
[FreeTextEntry1] : Allergy asthma symptoms Rule out viral complications Medrol pack for cough Antibiotic called

## 2024-04-25 NOTE — REVIEW OF SYSTEMS
[Sore Throat] : sore throat [Cough] : cough [Negative] : Endocrine [TextBox_14] : runny nose, sneezing

## 2024-04-25 NOTE — HISTORY OF PRESENT ILLNESS
[TextBox_4] : Acute visit for cough  Experiencing cough, sore throat, runny nose, and sneezing x 6 days; denies shortness of breath

## 2024-04-25 NOTE — REASON FOR VISIT
[Home] : at home, [unfilled] , at the time of the visit. [Medical Office: ([facility])___] : at the medical office located in  [Verbal consent obtained from patient] : the patient, [unfilled] [Acute] : an acute visit [Cough] : cough

## 2024-04-25 NOTE — ASSESSMENT
[FreeTextEntry1] : Patient to start course of Azithromycin 250 mg and medrol pack  To contact the office is symptoms worsen or persist

## 2024-05-20 ENCOUNTER — OFFICE (OUTPATIENT)
Dept: URBAN - METROPOLITAN AREA CLINIC 90 | Facility: CLINIC | Age: 69
Setting detail: OPHTHALMOLOGY
End: 2024-05-20
Payer: MEDICARE

## 2024-05-20 DIAGNOSIS — H31.002: ICD-10-CM

## 2024-05-20 DIAGNOSIS — H25.13: ICD-10-CM

## 2024-05-20 DIAGNOSIS — H17.9: ICD-10-CM

## 2024-05-20 DIAGNOSIS — H40.013: ICD-10-CM

## 2024-05-20 DIAGNOSIS — H43.393: ICD-10-CM

## 2024-05-20 DIAGNOSIS — H02.821: ICD-10-CM

## 2024-05-20 PROCEDURE — 92133 CPTRZD OPH DX IMG PST SGM ON: CPT | Performed by: OPHTHALMOLOGY

## 2024-05-20 PROCEDURE — 92083 EXTENDED VISUAL FIELD XM: CPT | Performed by: OPHTHALMOLOGY

## 2024-05-20 PROCEDURE — 92014 COMPRE OPH EXAM EST PT 1/>: CPT | Performed by: OPHTHALMOLOGY

## 2024-05-20 ASSESSMENT — CONFRONTATIONAL VISUAL FIELD TEST (CVF)
OD_FINDINGS: FULL
OS_FINDINGS: FULL

## 2024-06-12 ENCOUNTER — APPOINTMENT (OUTPATIENT)
Dept: PULMONOLOGY | Facility: CLINIC | Age: 69
End: 2024-06-12
Payer: MEDICARE

## 2024-06-12 ENCOUNTER — LABORATORY RESULT (OUTPATIENT)
Age: 69
End: 2024-06-12

## 2024-06-12 VITALS
DIASTOLIC BLOOD PRESSURE: 77 MMHG | HEART RATE: 69 BPM | SYSTOLIC BLOOD PRESSURE: 145 MMHG | RESPIRATION RATE: 16 BRPM | OXYGEN SATURATION: 96 %

## 2024-06-12 DIAGNOSIS — M81.0 AGE-RELATED OSTEOPOROSIS W/OUT CURRENT PATHOLOGICAL FRACTURE: ICD-10-CM

## 2024-06-12 DIAGNOSIS — M54.9 DORSALGIA, UNSPECIFIED: ICD-10-CM

## 2024-06-12 DIAGNOSIS — U07.1 COVID-19: ICD-10-CM

## 2024-06-12 DIAGNOSIS — E74.39 OTHER DISORDERS OF INTESTINAL CARBOHYDRATE ABSORPTION: ICD-10-CM

## 2024-06-12 DIAGNOSIS — M54.32 SCIATICA, LEFT SIDE: ICD-10-CM

## 2024-06-12 DIAGNOSIS — E03.9 HYPOTHYROIDISM, UNSPECIFIED: ICD-10-CM

## 2024-06-12 PROCEDURE — 99214 OFFICE O/P EST MOD 30 MIN: CPT

## 2024-06-12 PROCEDURE — 36415 COLL VENOUS BLD VENIPUNCTURE: CPT

## 2024-06-12 RX ORDER — FLUCONAZOLE 150 MG/1
150 TABLET ORAL DAILY
Qty: 3 | Refills: 1 | Status: COMPLETED | COMMUNITY
Start: 2023-02-25 | End: 2024-06-12

## 2024-06-12 RX ORDER — PREDNISONE 10 MG/1
10 TABLET ORAL
Qty: 18 | Refills: 0 | Status: COMPLETED | COMMUNITY
Start: 2024-01-15 | End: 2024-06-12

## 2024-06-12 RX ORDER — METHYLPREDNISOLONE 4 MG/1
4 TABLET ORAL
Qty: 1 | Refills: 0 | Status: COMPLETED | COMMUNITY
Start: 2024-04-25 | End: 2024-06-12

## 2024-06-12 RX ORDER — CIPROFLOXACIN HYDROCHLORIDE 500 MG/1
500 TABLET, FILM COATED ORAL
Qty: 14 | Refills: 0 | Status: COMPLETED | COMMUNITY
Start: 2023-02-25 | End: 2024-06-12

## 2024-06-12 RX ORDER — AZITHROMYCIN 250 MG/1
250 TABLET, FILM COATED ORAL
Qty: 1 | Refills: 0 | Status: COMPLETED | COMMUNITY
Start: 2024-04-25 | End: 2024-06-12

## 2024-06-12 RX ORDER — DOXYCYCLINE HYCLATE 100 MG/1
100 CAPSULE ORAL
Qty: 10 | Refills: 0 | Status: COMPLETED | COMMUNITY
Start: 2024-04-05 | End: 2024-06-12

## 2024-06-12 RX ORDER — PREDNISONE 10 MG/1
10 TABLET ORAL
Qty: 18 | Refills: 0 | Status: COMPLETED | COMMUNITY
Start: 2023-05-01 | End: 2024-06-12

## 2024-06-13 PROBLEM — M81.0 OSTEOPOROSIS: Status: ACTIVE | Noted: 2019-04-28

## 2024-06-13 PROBLEM — M54.9 BACK PAIN: Status: ACTIVE | Noted: 2021-08-23

## 2024-06-13 PROBLEM — M54.32 SCIATICA OF LEFT SIDE: Status: ACTIVE | Noted: 2020-09-22

## 2024-06-13 NOTE — HISTORY OF PRESENT ILLNESS
[FreeTextEntry1] : F/u  [de-identified] : Overall feeling well; no respiratory complaints reported at this time

## 2024-06-13 NOTE — PLAN
[FreeTextEntry1] : Venipuncture with labs drawn in office. Continue diclofenac 75 mg p.o. twice daily as needed for lower back pain/sciatica. Medications reviewed. Continue present medications. Return for follow up in 3 months.

## 2024-06-14 LAB
25(OH)D3 SERPL-MCNC: 56.4 NG/ML
ALBUMIN SERPL ELPH-MCNC: 4.6 G/DL
ALP BLD-CCNC: 58 U/L
ALT SERPL-CCNC: 17 U/L
ANION GAP SERPL CALC-SCNC: 12 MMOL/L
AST SERPL-CCNC: 19 U/L
BASOPHILS # BLD AUTO: 0.07 K/UL
BASOPHILS NFR BLD AUTO: 1 %
BILIRUB SERPL-MCNC: 0.2 MG/DL
BUN SERPL-MCNC: 22 MG/DL
CALCIUM SERPL-MCNC: 11 MG/DL
CHLORIDE SERPL-SCNC: 101 MMOL/L
CHOLEST SERPL-MCNC: 218 MG/DL
CO2 SERPL-SCNC: 25 MMOL/L
CREAT SERPL-MCNC: 0.9 MG/DL
EGFR: 69 ML/MIN/1.73M2
EOSINOPHIL # BLD AUTO: 0.18 K/UL
EOSINOPHIL NFR BLD AUTO: 2.5 %
ESTIMATED AVERAGE GLUCOSE: 114 MG/DL
GLUCOSE SERPL-MCNC: 97 MG/DL
HBA1C MFR BLD HPLC: 5.6 %
HCT VFR BLD CALC: 38.2 %
HDLC SERPL-MCNC: 60 MG/DL
HGB BLD-MCNC: 12.3 G/DL
IMM GRANULOCYTES NFR BLD AUTO: 0.1 %
LDLC SERPL CALC-MCNC: 130 MG/DL
LYMPHOCYTES # BLD AUTO: 2.09 K/UL
LYMPHOCYTES NFR BLD AUTO: 29.6 %
MAN DIFF?: NORMAL
MCHC RBC-ENTMCNC: 29.8 PG
MCHC RBC-ENTMCNC: 32.2 GM/DL
MCV RBC AUTO: 92.5 FL
MONOCYTES # BLD AUTO: 0.45 K/UL
MONOCYTES NFR BLD AUTO: 6.4 %
NEUTROPHILS # BLD AUTO: 4.27 K/UL
NEUTROPHILS NFR BLD AUTO: 60.4 %
NONHDLC SERPL-MCNC: 157 MG/DL
PLATELET # BLD AUTO: 232 K/UL
POTASSIUM SERPL-SCNC: 4.4 MMOL/L
PROT SERPL-MCNC: 7 G/DL
RBC # BLD: 4.13 M/UL
RBC # FLD: 13.9 %
SODIUM SERPL-SCNC: 138 MMOL/L
T3 SERPL-MCNC: 106 NG/DL
T3RU NFR SERPL: 1.1 TBI
T4 FREE SERPL-MCNC: 1.2 NG/DL
T4 SERPL-MCNC: 7.7 UG/DL
TRIGL SERPL-MCNC: 152 MG/DL
TSH SERPL-ACNC: 2.64 UIU/ML
WBC # FLD AUTO: 7.07 K/UL

## 2024-07-02 RX ORDER — DOXYCYCLINE HYCLATE 100 MG/1
100 CAPSULE ORAL
Qty: 10 | Refills: 0 | Status: ACTIVE | COMMUNITY
Start: 2024-07-02 | End: 1900-01-01

## 2024-08-29 DIAGNOSIS — N39.0 URINARY TRACT INFECTION, SITE NOT SPECIFIED: ICD-10-CM

## 2024-08-29 RX ORDER — NITROFURANTOIN MACROCRYSTALS 100 MG/1
100 CAPSULE ORAL
Qty: 6 | Refills: 0 | Status: ACTIVE | COMMUNITY
Start: 2024-08-29 | End: 1900-01-01

## 2024-08-29 RX ORDER — FLUCONAZOLE 150 MG/1
150 TABLET ORAL
Qty: 1 | Refills: 0 | Status: ACTIVE | COMMUNITY
Start: 2024-08-29 | End: 1900-01-01

## 2024-09-01 ENCOUNTER — NON-APPOINTMENT (OUTPATIENT)
Age: 69
End: 2024-09-01

## 2024-09-10 ENCOUNTER — APPOINTMENT (OUTPATIENT)
Dept: PULMONOLOGY | Facility: CLINIC | Age: 69
End: 2024-09-10
Payer: MEDICARE

## 2024-09-10 VITALS — DIASTOLIC BLOOD PRESSURE: 70 MMHG | SYSTOLIC BLOOD PRESSURE: 124 MMHG | HEART RATE: 70 BPM | OXYGEN SATURATION: 95 %

## 2024-09-10 DIAGNOSIS — M81.0 AGE-RELATED OSTEOPOROSIS W/OUT CURRENT PATHOLOGICAL FRACTURE: ICD-10-CM

## 2024-09-10 DIAGNOSIS — C54.1 MALIGNANT NEOPLASM OF ENDOMETRIUM: ICD-10-CM

## 2024-09-10 DIAGNOSIS — E03.9 HYPOTHYROIDISM, UNSPECIFIED: ICD-10-CM

## 2024-09-10 PROCEDURE — 36415 COLL VENOUS BLD VENIPUNCTURE: CPT

## 2024-09-10 PROCEDURE — 99213 OFFICE O/P EST LOW 20 MIN: CPT

## 2024-09-13 LAB
ALBUMIN SERPL ELPH-MCNC: 4.3 G/DL
ALP BLD-CCNC: 55 U/L
ALT SERPL-CCNC: 12 U/L
ANION GAP SERPL CALC-SCNC: 13 MMOL/L
AST SERPL-CCNC: 19 U/L
BASOPHILS # BLD AUTO: 0.08 K/UL
BASOPHILS NFR BLD AUTO: 0.9 %
BILIRUB SERPL-MCNC: 0.2 MG/DL
BUN SERPL-MCNC: 17 MG/DL
CALCIUM SERPL-MCNC: 10.1 MG/DL
CHLORIDE SERPL-SCNC: 104 MMOL/L
CHOLEST SERPL-MCNC: 196 MG/DL
CO2 SERPL-SCNC: 24 MMOL/L
CREAT SERPL-MCNC: 1 MG/DL
EGFR: 61 ML/MIN/1.73M2
EOSINOPHIL # BLD AUTO: 0.25 K/UL
EOSINOPHIL NFR BLD AUTO: 2.9 %
GLUCOSE SERPL-MCNC: 97 MG/DL
HCT VFR BLD CALC: 37.7 %
HDLC SERPL-MCNC: 53 MG/DL
HGB BLD-MCNC: 12.3 G/DL
IMM GRANULOCYTES NFR BLD AUTO: 0.3 %
LDLC SERPL CALC-MCNC: 114 MG/DL
LYMPHOCYTES # BLD AUTO: 2.13 K/UL
LYMPHOCYTES NFR BLD AUTO: 24.7 %
MAN DIFF?: NORMAL
MCHC RBC-ENTMCNC: 30.2 PG
MCHC RBC-ENTMCNC: 32.6 GM/DL
MCV RBC AUTO: 92.6 FL
MONOCYTES # BLD AUTO: 0.57 K/UL
MONOCYTES NFR BLD AUTO: 6.6 %
NEUTROPHILS # BLD AUTO: 5.57 K/UL
NEUTROPHILS NFR BLD AUTO: 64.6 %
NONHDLC SERPL-MCNC: 143 MG/DL
PLATELET # BLD AUTO: 280 K/UL
POTASSIUM SERPL-SCNC: 5.1 MMOL/L
PROT SERPL-MCNC: 6.9 G/DL
RBC # BLD: 4.07 M/UL
RBC # FLD: 13.5 %
SODIUM SERPL-SCNC: 140 MMOL/L
TRIGL SERPL-MCNC: 162 MG/DL
WBC # FLD AUTO: 8.63 K/UL

## 2024-09-13 NOTE — ASSESSMENT
[FreeTextEntry1] : Venipuncture with labs drawn in office.  Continue levothyroxine for hypothyroidism.   Continue Prolia for osteoporosis.   Return for follow up in 3 months.

## 2024-09-13 NOTE — PHYSICAL EXAM
[No Acute Distress] : no acute distress [Normal Oropharynx] : normal oropharynx [Normal Appearance] : normal appearance [No Neck Mass] : no neck mass [Normal Rate/Rhythm] : normal rate/rhythm [Normal S1, S2] : normal s1, s2 [No Murmurs] : no murmurs [No Resp Distress] : no resp distress [Clear to Auscultation Bilaterally] : clear to auscultation bilaterally [No Abnormalities] : no abnormalities [Benign] : benign [Normal Gait] : normal gait [No Clubbing] : no clubbing [No Cyanosis] : no cyanosis [No Edema] : no edema [FROM] : FROM [Normal Color/ Pigmentation] : normal color/ pigmentation [No Focal Deficits] : no focal deficits [Oriented x3] : oriented x3 [Normal Affect] : normal affect [TextBox_2] : Appears comfortable.  NAD

## 2024-09-16 RX ORDER — PREDNISONE 10 MG/1
10 TABLET ORAL
Qty: 12 | Refills: 0 | Status: ACTIVE | COMMUNITY
Start: 2024-09-16 | End: 1900-01-01

## 2024-09-16 RX ORDER — NITROFURANTOIN (MONOHYDRATE/MACROCRYSTALS) 25; 75 MG/1; MG/1
100 CAPSULE ORAL
Qty: 6 | Refills: 0 | Status: ACTIVE | COMMUNITY
Start: 2024-09-16 | End: 1900-01-01

## 2024-09-16 RX ORDER — AZITHROMYCIN 250 MG/1
250 TABLET, FILM COATED ORAL
Qty: 1 | Refills: 1 | Status: ACTIVE | COMMUNITY
Start: 2024-09-16 | End: 1900-01-01

## 2024-09-16 RX ORDER — NITROFURANTOIN MACROCRYSTALS 100 MG/1
100 CAPSULE ORAL
Qty: 6 | Refills: 0 | Status: ACTIVE | COMMUNITY
Start: 2024-09-16

## 2024-09-17 RX ORDER — LEVOFLOXACIN 500 MG/1
500 TABLET, FILM COATED ORAL DAILY
Qty: 5 | Refills: 0 | Status: ACTIVE | COMMUNITY
Start: 2024-09-17 | End: 1900-01-01

## 2024-10-03 ENCOUNTER — APPOINTMENT (OUTPATIENT)
Dept: PULMONOLOGY | Facility: CLINIC | Age: 69
End: 2024-10-03

## 2024-10-04 ENCOUNTER — APPOINTMENT (OUTPATIENT)
Dept: OBGYN | Facility: CLINIC | Age: 69
End: 2024-10-04

## 2024-10-04 ENCOUNTER — APPOINTMENT (OUTPATIENT)
Age: 69
End: 2024-10-04

## 2024-10-04 VITALS
OXYGEN SATURATION: 95 % | TEMPERATURE: 97.6 F | HEART RATE: 79 BPM | SYSTOLIC BLOOD PRESSURE: 153 MMHG | RESPIRATION RATE: 16 BRPM | DIASTOLIC BLOOD PRESSURE: 73 MMHG

## 2024-10-04 DIAGNOSIS — Z87.42 PERSONAL HISTORY OF OTHER DISEASES OF THE FEMALE GENITAL TRACT: ICD-10-CM

## 2024-10-04 DIAGNOSIS — R30.0 DYSURIA: ICD-10-CM

## 2024-10-04 DIAGNOSIS — N39.0 URINARY TRACT INFECTION, SITE NOT SPECIFIED: ICD-10-CM

## 2024-10-04 PROCEDURE — 99205 OFFICE O/P NEW HI 60 MIN: CPT

## 2024-10-04 PROCEDURE — G2211 COMPLEX E/M VISIT ADD ON: CPT

## 2024-10-04 NOTE — REVIEW OF SYSTEMS
[Date of last menstrual period ____] : date of last menstrual period: [unfilled] [Other ___] : [unfilled] [Urine Infection (bladder/kidney)] : bladder/kidney infection [Negative] : Heme/Lymph

## 2024-10-07 ENCOUNTER — APPOINTMENT (OUTPATIENT)
Dept: UROLOGY | Facility: CLINIC | Age: 69
End: 2024-10-07

## 2024-10-08 ENCOUNTER — APPOINTMENT (OUTPATIENT)
Dept: OBGYN | Facility: CLINIC | Age: 69
End: 2024-10-08
Payer: MEDICARE

## 2024-10-08 VITALS — DIASTOLIC BLOOD PRESSURE: 79 MMHG | SYSTOLIC BLOOD PRESSURE: 130 MMHG

## 2024-10-08 DIAGNOSIS — N94.9 UNSPECIFIED CONDITION ASSOCIATED WITH FEMALE GENITAL ORGANS AND MENSTRUAL CYCLE: ICD-10-CM

## 2024-10-08 DIAGNOSIS — C56.9 MALIGNANT NEOPLASM OF UNSPECIFIED OVARY: ICD-10-CM

## 2024-10-08 PROCEDURE — 99459 PELVIC EXAMINATION: CPT

## 2024-10-08 PROCEDURE — 99212 OFFICE O/P EST SF 10 MIN: CPT

## 2024-10-09 LAB
APPEARANCE: CLEAR
BACTERIA UR CULT: ABNORMAL
BACTERIA: NEGATIVE /HPF
BILIRUBIN URINE: NEGATIVE
BLOOD URINE: NEGATIVE
CAST: 0 /LPF
COLOR: YELLOW
EPITHELIAL CELLS: 0 /HPF
GLUCOSE QUALITATIVE U: NEGATIVE MG/DL
KETONES URINE: NEGATIVE MG/DL
LEUKOCYTE ESTERASE URINE: ABNORMAL
MICROSCOPIC-UA: NORMAL
NITRITE URINE: NEGATIVE
PH URINE: 7
PROTEIN URINE: NEGATIVE MG/DL
RED BLOOD CELLS URINE: 1 /HPF
REVIEW: NORMAL
SPECIFIC GRAVITY URINE: 1.01
UROBILINOGEN URINE: 0.2 MG/DL
WHITE BLOOD CELLS URINE: 2 /HPF

## 2024-10-10 ENCOUNTER — RX RENEWAL (OUTPATIENT)
Age: 69
End: 2024-10-10

## 2024-10-13 PROBLEM — Z87.42 HISTORY OF VAGINAL BLEEDING: Status: RESOLVED | Noted: 2017-10-27 | Resolved: 2024-10-13

## 2024-10-13 PROBLEM — R30.0 DYSURIA: Status: ACTIVE | Noted: 2024-10-13

## 2024-10-13 NOTE — PHYSICAL EXAM
[Normal Appearance] : normal appearance [Well Groomed] : well groomed [Edema] : no peripheral edema [General Appearance - In No Acute Distress] : no acute distress [Respiration, Rhythm And Depth] : normal respiratory rhythm and effort [Exaggerated Use Of Accessory Muscles For Inspiration] : no accessory muscle use [Abdomen Soft] : soft [Abdomen Tenderness] : non-tender [Costovertebral Angle Tenderness] : no ~M costovertebral angle tenderness [Urethral Meatus] : normal urethra [External Female Genitalia] : normal external genitalia [Urinary Bladder Findings] : the bladder was normal on palpation [] : no rash [Oriented To Time, Place, And Person] : oriented to person, place, and time [Affect] : the affect was normal [Mood] : the mood was normal [Chaperone Present] : A chaperone was present in the examining room during all aspects of the physical examination [de-identified] : left inguinal hernia [FreeTextEntry2] : HILLARY Cheney

## 2024-10-13 NOTE — ASSESSMENT
[FreeTextEntry1] : 70 yo F with history of recurrent UTI presents with dysuria  - Catheterized specimen obtained today for UA and culture - discussed possible etiologies for UTI. Spent extensive period of time discussed behavioral modification including adequate hydration, cutting back on caffeine intake, timed voiding during the day, importance of controlling any diabetes and chronic constipation and how all of these things could potentially increase risk for persistent or recurrent UTI.  - Strongly encouraged pt to increase hydration during the day and timed voiding every 2-3 hours. Restrict fluids in the evening and change pads as soon as they get wet   Patient is being seen today for evaluation and management of a chronic and longitudinal ongoing condition and I am of the primary treating physician.

## 2024-10-13 NOTE — PHYSICAL EXAM
[Normal Appearance] : normal appearance [Well Groomed] : well groomed [Edema] : no peripheral edema [General Appearance - In No Acute Distress] : no acute distress [Respiration, Rhythm And Depth] : normal respiratory rhythm and effort [Exaggerated Use Of Accessory Muscles For Inspiration] : no accessory muscle use [Abdomen Soft] : soft [Abdomen Tenderness] : non-tender [Costovertebral Angle Tenderness] : no ~M costovertebral angle tenderness [Urethral Meatus] : normal urethra [External Female Genitalia] : normal external genitalia [Urinary Bladder Findings] : the bladder was normal on palpation [] : no rash [Oriented To Time, Place, And Person] : oriented to person, place, and time [Affect] : the affect was normal [Mood] : the mood was normal [Chaperone Present] : A chaperone was present in the examining room during all aspects of the physical examination [de-identified] : left inguinal hernia [FreeTextEntry2] : HILLARY Cheney

## 2024-10-13 NOTE — HISTORY OF PRESENT ILLNESS
[FreeTextEntry1] : 65 yo F presents with a UTI Per pt, about 7-8 yrs ago getting recurrent UTI Was seeing a urologist Dr. Velazco - was on macrobid intermittently Was doing well until about 2 months ago, had a UTI requiring a course of macrobid At that time, noticed some itchy hands and feet Last week didn't drink all day at work and felt UTI symptoms afterwards Went to urgent care Has been taking Azo bid for 1 year Usually going to the bathroom only twice per day at work Drinks 2 bottles of water at night and is up all night urinating every hour chronic constipation dysuria and itching took some spare bactrim last night  10/4/24 Interval history: Pt hasn't been seen since 2019 Has actually been doing well until 2-3 weeks ago when she developed dysuria Admits she didn't urinate for 7 hours that day prior to onset of symptoms no urine test but got Macrobid which caused itching Subsequently was prescribed Cipro and then levaquin (last dose 2 weeks ago) Dysuria improved with abx  but recurred last night - feels vaginal "warmth" Baseline voids every 5-6 hours during the day and nocturia 2-3/night Usually at nighttime, she just goes in a pad and goes back to sleep Drinks 1 cup of water in the morning and 2 bottles of water at bedtime Last saw gyn - 2 yrs ago Gets routine MRI and CT at [facility] [place], most recently 6 months ago which was unremarkable, per pt

## 2024-10-13 NOTE — HISTORY OF PRESENT ILLNESS
[FreeTextEntry1] : 63 yo F presents with a UTI Per pt, about 7-8 yrs ago getting recurrent UTI Was seeing a urologist Dr. Velazco - was on macrobid intermittently Was doing well until about 2 months ago, had a UTI requiring a course of macrobid At that time, noticed some itchy hands and feet Last week didn't drink all day at work and felt UTI symptoms afterwards Went to urgent care Has been taking Azo bid for 1 year Usually going to the bathroom only twice per day at work Drinks 2 bottles of water at night and is up all night urinating every hour chronic constipation dysuria and itching took some spare bactrim last night  10/4/24 Interval history: Pt hasn't been seen since 2019 Has actually been doing well until 2-3 weeks ago when she developed dysuria Admits she didn't urinate for 7 hours that day prior to onset of symptoms no urine test but got Macrobid which caused itching Subsequently was prescribed Cipro and then levaquin (last dose 2 weeks ago) Dysuria improved with abx  but recurred last night - feels vaginal "warmth" Baseline voids every 5-6 hours during the day and nocturia 2-3/night Usually at nighttime, she just goes in a pad and goes back to sleep Drinks 1 cup of water in the morning and 2 bottles of water at bedtime Last saw gyn - 2 yrs ago Gets routine [place] CT at [facility] [place], most recently 6 months ago which was unremarkable, per pt

## 2024-10-13 NOTE — ASSESSMENT
[FreeTextEntry1] : 68 yo F with history of recurrent UTI presents with dysuria  - Catheterized specimen obtained today for UA and culture - discussed possible etiologies for UTI. Spent extensive period of time discussed behavioral modification including adequate hydration, cutting back on caffeine intake, timed voiding during the day, importance of controlling any diabetes and chronic constipation and how all of these things could potentially increase risk for persistent or recurrent UTI.  - Strongly encouraged pt to increase hydration during the day and timed voiding every 2-3 hours. Restrict fluids in the evening and change pads as soon as they get wet   Patient is being seen today for evaluation and management of a chronic and longitudinal ongoing condition and I am of the primary treating physician. Yes

## 2024-10-14 RX ORDER — SULFAMETHOXAZOLE AND TRIMETHOPRIM 800; 160 MG/1; MG/1
800-160 TABLET ORAL
Qty: 10 | Refills: 0 | Status: ACTIVE | COMMUNITY
Start: 2024-10-14 | End: 1900-01-01

## 2024-11-05 ENCOUNTER — APPOINTMENT (OUTPATIENT)
Dept: PULMONOLOGY | Facility: CLINIC | Age: 69
End: 2024-11-05

## 2024-11-08 ENCOUNTER — NON-APPOINTMENT (OUTPATIENT)
Age: 69
End: 2024-11-08

## 2024-11-15 ENCOUNTER — APPOINTMENT (OUTPATIENT)
Dept: ORTHOPEDIC SURGERY | Facility: CLINIC | Age: 69
End: 2024-11-15

## 2024-11-15 VITALS
HEIGHT: 65.5 IN | DIASTOLIC BLOOD PRESSURE: 74 MMHG | WEIGHT: 170 LBS | HEART RATE: 99 BPM | BODY MASS INDEX: 27.98 KG/M2 | SYSTOLIC BLOOD PRESSURE: 134 MMHG

## 2024-11-15 DIAGNOSIS — Z96.642 PRESENCE OF LEFT ARTIFICIAL HIP JOINT: ICD-10-CM

## 2024-11-15 DIAGNOSIS — M84.362A STRESS FRACTURE, LEFT TIBIA, INITIAL ENCOUNTER FOR FRACTURE: ICD-10-CM

## 2024-11-15 DIAGNOSIS — M25.562 PAIN IN LEFT KNEE: ICD-10-CM

## 2024-11-15 PROCEDURE — 73590 X-RAY EXAM OF LOWER LEG: CPT | Mod: LT

## 2024-11-15 PROCEDURE — 99203 OFFICE O/P NEW LOW 30 MIN: CPT

## 2024-11-22 RX ORDER — SULFAMETHOXAZOLE AND TRIMETHOPRIM 800; 160 MG/1; MG/1
800-160 TABLET ORAL TWICE DAILY
Qty: 10 | Refills: 0 | Status: ACTIVE | COMMUNITY
Start: 2024-11-22 | End: 1900-01-01

## 2024-11-24 PROBLEM — M84.362A STRESS FRACTURE OF LEFT TIBIA, INITIAL ENCOUNTER: Status: ACTIVE | Noted: 2024-11-24

## 2024-11-24 PROBLEM — Z96.642 HISTORY OF HEMIARTHROPLASTY OF LEFT HIP: Status: ACTIVE | Noted: 2024-11-24

## 2024-11-26 RX ORDER — TRAMADOL HYDROCHLORIDE 50 MG/1
50 TABLET, COATED ORAL
Qty: 90 | Refills: 0 | Status: ACTIVE | COMMUNITY
Start: 2024-11-26 | End: 1900-01-01

## 2024-11-27 DIAGNOSIS — M54.9 DORSALGIA, UNSPECIFIED: ICD-10-CM

## 2024-11-27 RX ORDER — ALPRAZOLAM 0.25 MG/1
0.25 TABLET ORAL
Qty: 90 | Refills: 0 | Status: ACTIVE | COMMUNITY
Start: 2024-11-27 | End: 1900-01-01

## 2024-12-16 ENCOUNTER — APPOINTMENT (OUTPATIENT)
Dept: ORTHOPEDIC SURGERY | Facility: CLINIC | Age: 69
End: 2024-12-16
Payer: MEDICARE

## 2024-12-16 DIAGNOSIS — M84.362A STRESS FRACTURE, LEFT TIBIA, INITIAL ENCOUNTER FOR FRACTURE: ICD-10-CM

## 2024-12-16 PROCEDURE — 73562 X-RAY EXAM OF KNEE 3: CPT | Mod: LT

## 2024-12-16 PROCEDURE — 99213 OFFICE O/P EST LOW 20 MIN: CPT

## 2025-01-27 ENCOUNTER — APPOINTMENT (OUTPATIENT)
Dept: ORTHOPEDIC SURGERY | Facility: CLINIC | Age: 70
End: 2025-01-27
Payer: MEDICARE

## 2025-01-27 DIAGNOSIS — M84.362A STRESS FRACTURE, LEFT TIBIA, INITIAL ENCOUNTER FOR FRACTURE: ICD-10-CM

## 2025-01-27 PROCEDURE — 73590 X-RAY EXAM OF LOWER LEG: CPT | Mod: LT

## 2025-01-27 PROCEDURE — 99213 OFFICE O/P EST LOW 20 MIN: CPT

## 2025-01-28 ENCOUNTER — APPOINTMENT (OUTPATIENT)
Dept: PULMONOLOGY | Facility: CLINIC | Age: 70
End: 2025-01-28
Payer: MEDICARE

## 2025-01-28 VITALS
RESPIRATION RATE: 16 BRPM | HEART RATE: 66 BPM | SYSTOLIC BLOOD PRESSURE: 150 MMHG | DIASTOLIC BLOOD PRESSURE: 77 MMHG | OXYGEN SATURATION: 95 %

## 2025-01-28 VITALS — SYSTOLIC BLOOD PRESSURE: 137 MMHG | DIASTOLIC BLOOD PRESSURE: 69 MMHG

## 2025-01-28 DIAGNOSIS — M81.0 AGE-RELATED OSTEOPOROSIS W/OUT CURRENT PATHOLOGICAL FRACTURE: ICD-10-CM

## 2025-01-28 DIAGNOSIS — I10 ESSENTIAL (PRIMARY) HYPERTENSION: ICD-10-CM

## 2025-01-28 DIAGNOSIS — M54.16 RADICULOPATHY, LUMBAR REGION: ICD-10-CM

## 2025-01-28 DIAGNOSIS — C54.1 MALIGNANT NEOPLASM OF ENDOMETRIUM: ICD-10-CM

## 2025-01-28 DIAGNOSIS — E03.9 HYPOTHYROIDISM, UNSPECIFIED: ICD-10-CM

## 2025-01-28 DIAGNOSIS — N63.0 UNSPECIFIED LUMP IN UNSPECIFIED BREAST: ICD-10-CM

## 2025-01-28 PROCEDURE — 77085 DXA BONE DENSITY AXL VRT FX: CPT

## 2025-01-28 PROCEDURE — 99214 OFFICE O/P EST MOD 30 MIN: CPT | Mod: 25

## 2025-02-28 ENCOUNTER — RESULT REVIEW (OUTPATIENT)
Age: 70
End: 2025-02-28

## 2025-02-28 ENCOUNTER — APPOINTMENT (OUTPATIENT)
Dept: MAMMOGRAPHY | Facility: IMAGING CENTER | Age: 70
End: 2025-02-28

## 2025-02-28 ENCOUNTER — OUTPATIENT (OUTPATIENT)
Dept: OUTPATIENT SERVICES | Facility: HOSPITAL | Age: 70
LOS: 1 days | End: 2025-02-28
Payer: MEDICARE

## 2025-02-28 ENCOUNTER — APPOINTMENT (OUTPATIENT)
Dept: ULTRASOUND IMAGING | Facility: IMAGING CENTER | Age: 70
End: 2025-02-28

## 2025-02-28 DIAGNOSIS — N63.0 UNSPECIFIED LUMP IN UNSPECIFIED BREAST: ICD-10-CM

## 2025-02-28 DIAGNOSIS — Z98.890 OTHER SPECIFIED POSTPROCEDURAL STATES: Chronic | ICD-10-CM

## 2025-02-28 PROCEDURE — 76641 ULTRASOUND BREAST COMPLETE: CPT

## 2025-02-28 PROCEDURE — 77063 BREAST TOMOSYNTHESIS BI: CPT

## 2025-02-28 PROCEDURE — 76641 ULTRASOUND BREAST COMPLETE: CPT | Mod: 26,50,GA

## 2025-02-28 PROCEDURE — 77067 SCR MAMMO BI INCL CAD: CPT

## 2025-02-28 PROCEDURE — 77063 BREAST TOMOSYNTHESIS BI: CPT | Mod: 26

## 2025-02-28 PROCEDURE — 77067 SCR MAMMO BI INCL CAD: CPT | Mod: 26

## 2025-03-25 ENCOUNTER — LABORATORY RESULT (OUTPATIENT)
Age: 70
End: 2025-03-25

## 2025-03-25 ENCOUNTER — NON-APPOINTMENT (OUTPATIENT)
Age: 70
End: 2025-03-25

## 2025-03-25 ENCOUNTER — APPOINTMENT (OUTPATIENT)
Dept: PULMONOLOGY | Facility: CLINIC | Age: 70
End: 2025-03-25
Payer: MEDICARE

## 2025-03-25 VITALS
SYSTOLIC BLOOD PRESSURE: 149 MMHG | WEIGHT: 165 LBS | DIASTOLIC BLOOD PRESSURE: 76 MMHG | BODY MASS INDEX: 27.49 KG/M2 | HEIGHT: 65 IN | HEART RATE: 69 BPM | OXYGEN SATURATION: 96 %

## 2025-03-25 VITALS — DIASTOLIC BLOOD PRESSURE: 70 MMHG | SYSTOLIC BLOOD PRESSURE: 130 MMHG

## 2025-03-25 DIAGNOSIS — I10 ESSENTIAL (PRIMARY) HYPERTENSION: ICD-10-CM

## 2025-03-25 DIAGNOSIS — M81.0 AGE-RELATED OSTEOPOROSIS W/OUT CURRENT PATHOLOGICAL FRACTURE: ICD-10-CM

## 2025-03-25 DIAGNOSIS — C56.9 MALIGNANT NEOPLASM OF UNSPECIFIED OVARY: ICD-10-CM

## 2025-03-25 DIAGNOSIS — C54.1 MALIGNANT NEOPLASM OF ENDOMETRIUM: ICD-10-CM

## 2025-03-25 DIAGNOSIS — Z00.00 ENCOUNTER FOR GENERAL ADULT MEDICAL EXAMINATION W/OUT ABNORMAL FINDINGS: ICD-10-CM

## 2025-03-25 DIAGNOSIS — E74.39 OTHER DISORDERS OF INTESTINAL CARBOHYDRATE ABSORPTION: ICD-10-CM

## 2025-03-25 DIAGNOSIS — E03.9 HYPOTHYROIDISM, UNSPECIFIED: ICD-10-CM

## 2025-03-25 PROCEDURE — 93000 ELECTROCARDIOGRAM COMPLETE: CPT

## 2025-03-25 PROCEDURE — 99214 OFFICE O/P EST MOD 30 MIN: CPT | Mod: 25

## 2025-03-25 PROCEDURE — 36415 COLL VENOUS BLD VENIPUNCTURE: CPT

## 2025-03-30 LAB
25(OH)D3 SERPL-MCNC: 56.3 NG/ML
ALBUMIN SERPL ELPH-MCNC: 4.4 G/DL
ALP BLD-CCNC: 90 U/L
ALT SERPL-CCNC: 19 U/L
ANION GAP SERPL CALC-SCNC: 13 MMOL/L
AST SERPL-CCNC: 25 U/L
BASOPHILS # BLD AUTO: 0.07 K/UL
BASOPHILS NFR BLD AUTO: 1 %
BILIRUB SERPL-MCNC: 0.3 MG/DL
BUN SERPL-MCNC: 14 MG/DL
CALCIUM SERPL-MCNC: 9.9 MG/DL
CHLORIDE SERPL-SCNC: 108 MMOL/L
CHOLEST SERPL-MCNC: 201 MG/DL
CO2 SERPL-SCNC: 23 MMOL/L
CREAT SERPL-MCNC: 0.82 MG/DL
EGFRCR SERPLBLD CKD-EPI 2021: 76 ML/MIN/1.73M2
EOSINOPHIL # BLD AUTO: 0.21 K/UL
EOSINOPHIL NFR BLD AUTO: 3.1 %
ESTIMATED AVERAGE GLUCOSE: 117 MG/DL
GLUCOSE SERPL-MCNC: 89 MG/DL
HBA1C MFR BLD HPLC: 5.7 %
HCT VFR BLD CALC: 39.1 %
HCV AB SER QL: NONREACTIVE
HCV S/CO RATIO: 0.09 S/CO
HDLC SERPL-MCNC: 58 MG/DL
HGB BLD-MCNC: 11.9 G/DL
IMM GRANULOCYTES NFR BLD AUTO: 0.1 %
LDLC SERPL-MCNC: 116 MG/DL
LYMPHOCYTES # BLD AUTO: 1.75 K/UL
LYMPHOCYTES NFR BLD AUTO: 25.7 %
MAGNESIUM SERPL-MCNC: 2.4 MG/DL
MAN DIFF?: NORMAL
MCHC RBC-ENTMCNC: 29.2 PG
MCHC RBC-ENTMCNC: 30.4 G/DL
MCV RBC AUTO: 95.8 FL
MONOCYTES # BLD AUTO: 0.44 K/UL
MONOCYTES NFR BLD AUTO: 6.5 %
NEUTROPHILS # BLD AUTO: 4.34 K/UL
NEUTROPHILS NFR BLD AUTO: 63.6 %
NONHDLC SERPL-MCNC: 142 MG/DL
PHOSPHATE SERPL-MCNC: 3.7 MG/DL
PLATELET # BLD AUTO: 262 K/UL
POTASSIUM SERPL-SCNC: 4.6 MMOL/L
PROT SERPL-MCNC: 7 G/DL
RBC # BLD: 4.08 M/UL
RBC # FLD: 14.8 %
SODIUM SERPL-SCNC: 145 MMOL/L
T3 SERPL-MCNC: 98 NG/DL
T3RU NFR SERPL: 1.1 TBI
T4 FREE SERPL-MCNC: 1.1 NG/DL
T4 SERPL-MCNC: 8 UG/DL
TRIGL SERPL-MCNC: 152 MG/DL
TSH SERPL-ACNC: 3.26 UIU/ML
WBC # FLD AUTO: 6.82 K/UL

## 2025-04-25 ENCOUNTER — APPOINTMENT (OUTPATIENT)
Dept: PULMONOLOGY | Facility: CLINIC | Age: 70
End: 2025-04-25
Payer: MEDICARE

## 2025-04-25 VITALS — OXYGEN SATURATION: 98 % | HEART RATE: 67 BPM | SYSTOLIC BLOOD PRESSURE: 109 MMHG | DIASTOLIC BLOOD PRESSURE: 64 MMHG

## 2025-04-25 DIAGNOSIS — M81.0 AGE-RELATED OSTEOPOROSIS W/OUT CURRENT PATHOLOGICAL FRACTURE: ICD-10-CM

## 2025-04-25 DIAGNOSIS — E74.39 OTHER DISORDERS OF INTESTINAL CARBOHYDRATE ABSORPTION: ICD-10-CM

## 2025-04-25 DIAGNOSIS — N94.9 UNSPECIFIED CONDITION ASSOCIATED WITH FEMALE GENITAL ORGANS AND MENSTRUAL CYCLE: ICD-10-CM

## 2025-04-25 DIAGNOSIS — N39.0 URINARY TRACT INFECTION, SITE NOT SPECIFIED: ICD-10-CM

## 2025-04-25 DIAGNOSIS — E03.9 HYPOTHYROIDISM, UNSPECIFIED: ICD-10-CM

## 2025-04-25 DIAGNOSIS — I10 ESSENTIAL (PRIMARY) HYPERTENSION: ICD-10-CM

## 2025-04-25 PROCEDURE — G2211 COMPLEX E/M VISIT ADD ON: CPT

## 2025-04-25 PROCEDURE — 99214 OFFICE O/P EST MOD 30 MIN: CPT

## 2025-04-25 RX ORDER — SULFAMETHOXAZOLE AND TRIMETHOPRIM 800; 160 MG/1; MG/1
800-160 TABLET ORAL TWICE DAILY
Qty: 10 | Refills: 0 | Status: ACTIVE | COMMUNITY
Start: 2025-04-25 | End: 1900-01-01

## 2025-06-05 RX ORDER — AZITHROMYCIN 250 MG/1
250 TABLET, FILM COATED ORAL
Qty: 1 | Refills: 0 | Status: ACTIVE | COMMUNITY
Start: 2025-06-05 | End: 1900-01-01

## 2025-06-10 RX ORDER — METHYLPREDNISOLONE 4 MG/1
4 TABLET ORAL
Qty: 1 | Refills: 0 | Status: ACTIVE | COMMUNITY
Start: 2025-06-10 | End: 1900-01-01

## 2025-06-13 ENCOUNTER — APPOINTMENT (OUTPATIENT)
Dept: PULMONOLOGY | Facility: CLINIC | Age: 70
End: 2025-06-13
Payer: MEDICARE

## 2025-06-13 VITALS — SYSTOLIC BLOOD PRESSURE: 136 MMHG | DIASTOLIC BLOOD PRESSURE: 72 MMHG | HEART RATE: 70 BPM | OXYGEN SATURATION: 97 %

## 2025-06-13 PROCEDURE — G2211 COMPLEX E/M VISIT ADD ON: CPT

## 2025-06-13 PROCEDURE — 99214 OFFICE O/P EST MOD 30 MIN: CPT

## 2025-06-13 PROCEDURE — 36415 COLL VENOUS BLD VENIPUNCTURE: CPT

## 2025-06-15 LAB
ESTIMATED AVERAGE GLUCOSE: 126 MG/DL
HBA1C MFR BLD HPLC: 6 %

## 2025-06-27 ENCOUNTER — OFFICE (OUTPATIENT)
Facility: LOCATION | Age: 70
Setting detail: OPHTHALMOLOGY
End: 2025-06-27
Payer: MEDICARE

## 2025-06-27 DIAGNOSIS — H02.821: ICD-10-CM

## 2025-06-27 DIAGNOSIS — H40.013: ICD-10-CM

## 2025-06-27 DIAGNOSIS — H43.811: ICD-10-CM

## 2025-06-27 DIAGNOSIS — H31.002: ICD-10-CM

## 2025-06-27 DIAGNOSIS — H43.393: ICD-10-CM

## 2025-06-27 DIAGNOSIS — H25.13: ICD-10-CM

## 2025-06-27 DIAGNOSIS — H17.9: ICD-10-CM

## 2025-06-27 PROCEDURE — 92133 CPTRZD OPH DX IMG PST SGM ON: CPT | Performed by: OPHTHALMOLOGY

## 2025-06-27 PROCEDURE — 92083 EXTENDED VISUAL FIELD XM: CPT | Performed by: OPHTHALMOLOGY

## 2025-06-27 PROCEDURE — 92014 COMPRE OPH EXAM EST PT 1/>: CPT | Performed by: OPHTHALMOLOGY

## 2025-06-27 ASSESSMENT — TONOMETRY
OS_IOP_MMHG: 16
OD_IOP_MMHG: 17

## 2025-06-27 ASSESSMENT — REFRACTION_AUTOREFRACTION
OD_AXIS: 045
OD_CYLINDER: -0.50
OS_CYLINDER: -1.00
OD_SPHERE: -0.75
OS_AXIS: 092
OS_SPHERE: +0.25

## 2025-06-27 ASSESSMENT — REFRACTION_CURRENTRX
OD_OVR_VA: 20/
OS_AXIS: 083
OS_SPHERE: -1.50
OD_CYLINDER: -0.50
OD_SPHERE: -1.25
OD_CYLINDER: -0.50
OD_CYLINDER: -0.50
OS_OVR_VA: 20/
OS_SPHERE: -1.50
OD_SPHERE: -1.25
OS_CYLINDER: -0.50
OS_VPRISM_DIRECTION: SV
OS_CYLINDER: -0.50
OD_SPHERE: -1.25
OS_OVR_VA: 20/
OD_OVR_VA: 20/
OD_AXIS: 079
OS_OVR_VA: 20/
OS_AXIS: 086
OD_AXIS: 080
OS_AXIS: 087
OD_AXIS: 083
OD_OVR_VA: 20/
OS_VPRISM_DIRECTION: SV
OD_VPRISM_DIRECTION: SV
OS_SPHERE: -1.50
OD_VPRISM_DIRECTION: SV
OD_VPRISM_DIRECTION: SV
OS_VPRISM_DIRECTION: SV
OS_CYLINDER: -0.50

## 2025-06-27 ASSESSMENT — KERATOMETRY
OS_K1POWER_DIOPTERS: 44.25
OD_K2POWER_DIOPTERS: 46.25
OS_AXISANGLE_DEGREES: 090
METHOD_AUTO_MANUAL: AUTO
OS_K2POWER_DIOPTERS: 44.25
OD_K1POWER_DIOPTERS: 44.25
OD_AXISANGLE_DEGREES: 104

## 2025-06-27 ASSESSMENT — REFRACTION_MANIFEST
OS_AXIS: 085
OS_CYLINDER: -0.50
OD_AXIS: 080
OS_SPHERE: -1.50
OD_SPHERE: -1.25
OS_SPHERE: -1.25
OS_SPHERE: -0.75
OD_CYLINDER: -0.50
OU_VA: 20/20-2
OD_AXIS: 080
OS_AXIS: 099
OS_AXIS: 100
OD_AXIS: 080
OD_SPHERE: -1.25
OD_AXIS: 090
OD_CYLINDER: -0.50
OD_SPHERE: -0.75
OD_VA1: 20/20
OS_CYLINDER: -0.50
OD_SPHERE: -1.50
OS_CYLINDER: -0.50
OS_VA1: 20/20-1
OS_SPHERE: -1.50
OD_VA1: 20/20
OS_VA1: 20/20
OU_VA: 20/20
OD_CYLINDER: -0.50
OS_CYLINDER: -1.00
OD_CYLINDER: -0.50
OS_AXIS: 085

## 2025-06-27 ASSESSMENT — CONFRONTATIONAL VISUAL FIELD TEST (CVF)
OS_FINDINGS: FULL
OD_FINDINGS: FULL

## 2025-06-27 ASSESSMENT — PACHYMETRY
OS_CT_CORRECTION: 0
OD_CT_CORRECTION: 0
OS_CT_UM: 544
OD_CT_UM: 545

## 2025-06-27 ASSESSMENT — VISUAL ACUITY
OS_BCVA: 20/20-2
OD_BCVA: 20/20-1-

## 2025-09-01 ENCOUNTER — RX RENEWAL (OUTPATIENT)
Age: 70
End: 2025-09-01

## 2025-09-05 RX ORDER — MODERNA COVID-19 VACCINE 25 UG/.25ML
25 INJECTION, SUSPENSION INTRAMUSCULAR ONCE
Qty: 1 | Refills: 0 | Status: ACTIVE | COMMUNITY
Start: 2025-09-05 | End: 1900-01-01

## 2025-09-12 ENCOUNTER — APPOINTMENT (OUTPATIENT)
Dept: PULMONOLOGY | Facility: CLINIC | Age: 70
End: 2025-09-12
Payer: MEDICARE

## 2025-09-12 VITALS — HEART RATE: 67 BPM | OXYGEN SATURATION: 95 % | DIASTOLIC BLOOD PRESSURE: 70 MMHG | SYSTOLIC BLOOD PRESSURE: 130 MMHG

## 2025-09-12 VITALS — HEIGHT: 65 IN | BODY MASS INDEX: 27.49 KG/M2 | WEIGHT: 165 LBS

## 2025-09-12 DIAGNOSIS — I10 ESSENTIAL (PRIMARY) HYPERTENSION: ICD-10-CM

## 2025-09-12 DIAGNOSIS — C56.9 MALIGNANT NEOPLASM OF UNSPECIFIED OVARY: ICD-10-CM

## 2025-09-12 DIAGNOSIS — E03.9 HYPOTHYROIDISM, UNSPECIFIED: ICD-10-CM

## 2025-09-12 DIAGNOSIS — E74.39 OTHER DISORDERS OF INTESTINAL CARBOHYDRATE ABSORPTION: ICD-10-CM

## 2025-09-12 PROCEDURE — 99214 OFFICE O/P EST MOD 30 MIN: CPT | Mod: 25

## 2025-09-12 PROCEDURE — 36415 COLL VENOUS BLD VENIPUNCTURE: CPT

## 2025-09-15 LAB
ALBUMIN SERPL ELPH-MCNC: 4.4 G/DL
ALP BLD-CCNC: 61 U/L
ALT SERPL-CCNC: 19 U/L
ANION GAP SERPL CALC-SCNC: 12 MMOL/L
AST SERPL-CCNC: 23 U/L
BASOPHILS # BLD AUTO: 0.08 K/UL
BASOPHILS NFR BLD AUTO: 1.1 %
BILIRUB SERPL-MCNC: 0.2 MG/DL
BUN SERPL-MCNC: 18 MG/DL
CALCIUM SERPL-MCNC: 10.7 MG/DL
CHLORIDE SERPL-SCNC: 102 MMOL/L
CO2 SERPL-SCNC: 26 MMOL/L
CREAT SERPL-MCNC: 0.84 MG/DL
EGFRCR SERPLBLD CKD-EPI 2021: 74 ML/MIN/1.73M2
EOSINOPHIL # BLD AUTO: 0.32 K/UL
EOSINOPHIL NFR BLD AUTO: 4.4 %
ESTIMATED AVERAGE GLUCOSE: 120 MG/DL
GLUCOSE SERPL-MCNC: 89 MG/DL
HBA1C MFR BLD HPLC: 5.8 %
HCT VFR BLD CALC: 35.8 %
HGB BLD-MCNC: 11.2 G/DL
IMM GRANULOCYTES NFR BLD AUTO: 0.1 %
LYMPHOCYTES # BLD AUTO: 1.81 K/UL
LYMPHOCYTES NFR BLD AUTO: 25 %
MAN DIFF?: NORMAL
MCHC RBC-ENTMCNC: 28.1 PG
MCHC RBC-ENTMCNC: 31.3 G/DL
MCV RBC AUTO: 89.7 FL
MONOCYTES # BLD AUTO: 0.55 K/UL
MONOCYTES NFR BLD AUTO: 7.6 %
NEUTROPHILS # BLD AUTO: 4.48 K/UL
NEUTROPHILS NFR BLD AUTO: 61.8 %
PLATELET # BLD AUTO: 281 K/UL
POTASSIUM SERPL-SCNC: 5.2 MMOL/L
PROT SERPL-MCNC: 7.2 G/DL
RBC # BLD: 3.99 M/UL
RBC # FLD: 14.1 %
SODIUM SERPL-SCNC: 140 MMOL/L
T4 FREE SERPL-MCNC: 1.2 NG/DL
TSH SERPL-ACNC: 2.19 UIU/ML
WBC # FLD AUTO: 7.25 K/UL

## (undated) DEVICE — DRSG MASTISOL

## (undated) DEVICE — DRAPE THYROID 77" X 123"

## (undated) DEVICE — ELCTR ROCKER SWITCH PENCIL BLUE 10FT

## (undated) DEVICE — WARMING BLANKET UPPER ADULT

## (undated) DEVICE — GLV 7 PROTEXIS (WHITE)

## (undated) DEVICE — SYR LUER LOK 10CC

## (undated) DEVICE — ELCTR GROUNDING PAD ADULT COVIDIEN

## (undated) DEVICE — WARMING BLANKET LOWER ADULT

## (undated) DEVICE — SOL IRR POUR NS 0.9% 500ML

## (undated) DEVICE — DRSG STERISTRIPS 0.5 X 4"

## (undated) DEVICE — LABELS BLANK W PEN

## (undated) DEVICE — SUT CHROMIC 3-0 27" SH

## (undated) DEVICE — POSITIONER PATIENT SAFETY STRAP 3X60"

## (undated) DEVICE — VENODYNE/SCD SLEEVE CALF LARGE

## (undated) DEVICE — SUT MONOCRYL 4-0 27" PS-2 UNDYED

## (undated) DEVICE — BIPOLAR FORCEP CORD WECK STANDARD 12FT

## (undated) DEVICE — VENODYNE/SCD SLEEVE CALF BARIATRIC

## (undated) DEVICE — POSITIONER FOAM EGG CRATE ULNAR 2PCS (PINK)

## (undated) DEVICE — PACK MINOR

## (undated) DEVICE — VENODYNE/SCD SLEEVE CALF MEDIUM

## (undated) DEVICE — DRAPE 3/4 SHEET 52X76"

## (undated) DEVICE — ELCTR BOVIE TIP BLADE INSULATED 2.75" EDGE